# Patient Record
Sex: FEMALE | Race: WHITE | NOT HISPANIC OR LATINO | Employment: UNEMPLOYED | URBAN - METROPOLITAN AREA
[De-identification: names, ages, dates, MRNs, and addresses within clinical notes are randomized per-mention and may not be internally consistent; named-entity substitution may affect disease eponyms.]

---

## 2017-01-27 ENCOUNTER — OFFICE VISIT (OUTPATIENT)
Dept: NEUROLOGY | Facility: CLINIC | Age: 22
End: 2017-01-27
Payer: OTHER GOVERNMENT

## 2017-01-27 VITALS
HEART RATE: 60 BPM | DIASTOLIC BLOOD PRESSURE: 80 MMHG | SYSTOLIC BLOOD PRESSURE: 120 MMHG | BODY MASS INDEX: 20.56 KG/M2 | WEIGHT: 111.75 LBS | HEIGHT: 62 IN

## 2017-01-27 DIAGNOSIS — R55 SYNCOPE, UNSPECIFIED SYNCOPE TYPE: Primary | ICD-10-CM

## 2017-01-27 PROCEDURE — 99204 OFFICE O/P NEW MOD 45 MIN: CPT | Mod: S$PBB,,, | Performed by: PSYCHIATRY & NEUROLOGY

## 2017-01-27 PROCEDURE — 99999 PR PBB SHADOW E&M-EST. PATIENT-LVL III: CPT | Mod: PBBFAC,,, | Performed by: PSYCHIATRY & NEUROLOGY

## 2017-01-27 PROCEDURE — 99213 OFFICE O/P EST LOW 20 MIN: CPT | Mod: PBBFAC | Performed by: PSYCHIATRY & NEUROLOGY

## 2017-01-27 RX ORDER — LEVETIRACETAM 250 MG/1
500 TABLET ORAL 2 TIMES DAILY
Qty: 120 TABLET | Refills: 11 | Status: SHIPPED | OUTPATIENT
Start: 2017-01-27 | End: 2023-01-03

## 2017-01-27 NOTE — LETTER
January 27, 2017      Francine Singh MD  [address] [facility] [address]           [facility]  [address] [place] [address]  Phone: [phone]  Fax: [phone]          Patient: Sherice De Santiago   MR Number: [id number]   YOB: 1995   Date of Visit: 1/27/2017       Dear Dr. Francine Singh:    Thank you for referring Sherice De Santiago to me for evaluation. Attached you will find relevant portions of my assessment and plan of care.    If you have questions, please do not hesitate to call me. I look forward to following Sherice De Santiago along with you.    Sincerely,    Colin Mckeon MD    Enclosure  CC:  No Recipients    If you would like to receive this communication electronically, please contact [email or web address] or [phone] to request more information on [email or web address] Link access.    For providers and/or their staff who would like to refer a patient to [facility], please contact us through our one-stop-shop provider referral line, [facility], at [phone].    If you feel you have received this communication in error or would no longer like to receive these types of communications, please e-mail [email or web address]

## 2017-01-27 NOTE — MR AVS SNAPSHOT
[facility]  [address]  [place] [address]  Phone: [phone]  Fax: [phone]                  Sherice De Santiago   2017 2:00 PM   Office Visit    Description:  Female : 1995   Provider:  Colin Mckeon MD   Department:  [facility]           Reason for Visit     Seizures           Diagnoses this Visit        Comments    Syncope, unspecified syncope type    -  Primary            To Do List           Future Appointments        Provider Department Dept Phone    2017 11:40 AM Colin Mckeon MD [facility] [phone]      Goals (5 Years of Data)     None      Follow-Up and Disposition     Return in about 3 months (around 2017).       These Medications        Disp Refills Start End    levetiracetam (KEPPRA) 250 MG Tab 120 tablet 11 2017    Take 2 tablets (500 mg total) by mouth 2 (two) times daily. - Oral    Pharmacy: [facility] - [address] AT [facility] Ph #: [phone]         [facility]     [facility] On [facility] Nurse Care Line -  Assistance  Registered nurses in the [facility] Center provide clinical advisement, health education, appointment booking, and other advisory services.  Call for this free service at [phone].             Medications           Message regarding Medications     Verify the changes and/or additions to your medication regime listed below are the same as discussed with your clinician today.  If any of these changes or additions are incorrect, please notify your healthcare provider.        START taking these NEW medications        Refills    levetiracetam (KEPPRA) 250 MG Tab 11    Sig: Take 2 tablets (500 mg total) by mouth 2 (two) times daily.    Class: Normal    Route: Oral           Verify that the below list of medications is an accurate representation of the medications you are currently taking.  If none reported, the list may  "be blank. If incorrect, please contact your healthcare provider. Carry this list with you in case of emergency.           Current Medications     hydrOXYzine pamoate (VISTARIL) 25 MG Cap Take 1 capsule (25 mg total) by mouth nightly as needed.    levetiracetam (KEPPRA) 250 MG Tab Take 2 tablets (500 mg total) by mouth 2 (two) times daily.           Clinical Reference Information           Vital Signs - Last Recorded  Most recent update: 1/27/2017  2:03 PM by Saadia Cano MA    BP Pulse Ht Wt BMI    120/80 (87 %/ 94 %)* 60 5' 2" (1.575 m) (19 %, Z= -0.90) 50.7 kg (111 lb 12.4 oz) (19 %, Z= -0.87) 20.44 kg/m2 (35 %, Z= -0.40)    *BP percentiles are based on NHBPEP's 4th Report    Growth percentiles are based on CDC 2-20 Years data.      Blood Pressure          Most Recent Value    BP  120/80      Allergies as of 1/27/2017     No Known Allergies      Immunizations Administered on Date of Encounter - 1/27/2017     None      "

## 2017-01-27 NOTE — PROGRESS NOTES
"This is a 21-year-old left-handed patient who indicates that she has had an almost lifelong experience with seizure which began around the age of 4.  The patient states that in the past her seizure has usually been triggered by a painful stimulus.  The patient remembers around the age of 10 being placed on medication until she was around the age of 14.  In those for years, the patient states that she did not have any seizure.  After the age of 14, her medications were withdrawn.    The patient states that since then she has had very infrequent episodes.  The patient states that she has had multiple EEGs and scans some of which were abnormal others of which were questionable as far as abnormality on an EEG.  The patient states that she has seen neurology in multiple different areas of the country as she was moving in association with her family who was in the .    Her last episode occurred about 1 month ago and her episode prior to that was 8 months before.  The episode begins with an aura of lethargy.  This is then followed by a gradual shutdown of "all my senses" until she finally loses vision and then has loss of consciousness.  The involving symptoms occur over less than than a minute.  The patient states that when she loses consciousness she will fall.  She has been told that her eyes roll up and that she shakes both arms and both legs uncontrollably for less than a minute.  She is only had 1 episode of tongue biting.  She has had very infrequent episodes of incontinence.    Following the episode, she feels "exhausted and out of it."  She may or may not have a headache afterwards.    The patient is voicing concern because she is also experiencing rather significant symptoms of anxiety.  In fact, she does have an upcoming appointment with psychiatry to discuss possible medication management of her anxiety.    The patient does not recall the name of prior medications.  When naming multiple medications, the " patient could not recall which of the medications mentioned she may have been exposed to.  As mentioned, she's had MRI done which was normal.  Her EEGs have been variously normal or abnormal.  She does not know any further details of those studies.      ROS:  GENERAL: No fever, chills, fatigability or weight loss.  SKIN: No rashes, itching or changes in color or texture of skin.  HEAD: No headaches or recent head trauma.  EYES: Visual acuity fine. No photophobia, ocular pain or diplopia.  EARS: Denies ear pain, discharge or vertigo.  NOSE: No loss of smell, no epistaxis or postnasal drip.  MOUTH & THROAT: No hoarseness or change in voice. No excessive gum bleeding.  NODES: Denies swollen glands.  CHEST: Denies ORDOÑEZ, cyanosis, wheezing, cough and sputum production.  CARDIOVASCULAR: Denies chest pain, PND, orthopnea or reduced exercise tolerance.  ABDOMEN: Appetite fine. No weight loss. Denies diarrhea, abdominal pain, hematemesis or blood in stool.  URINARY: No flank pain, dysuria or hematuria.  MUSCULOSKELETAL: No joint stiffness or swelling. Denies back pain.  NEUROLOGIC: No history of  paralysis, alteration of gait or coordination.    PAST HISTORY:  Surgery: Adenoidectomy, tubes in the ears  Medical: No other chronic medical illnesses  ALLERGIES: NO KNOWN DRUG ALLERGIES    FAMILY HISTORY:  The patient's father  at age 28 of an accidental drug overdose.  Apparently he was known to have had generalized seizures.  The patient's paternal grandmother was also apparently utilizing drugs and had seizures when using drugs.  The patient's mother is living and in good health.  She has 2 half-sisters.  There is no other known family history of seizure.    SOCIAL HISTORY:  The patient is single.  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    PE:   VITAL SIGNS: Blood pressure 120/80, pulse 76, weight 50.7 kg, height 5 foot 2 inches, BMI 20.44  APPEARANCE: Well nourished,  well developed, in no acute distress.  The patient is somewhat anxious.    HEAD: Normocephalic, atraumatic.  EYES: PERRL. EOMI.  Non-icteric sclerae.    EARS: TM's intact. Light reflex normal. No retraction or perforation.    NOSE: Mucosa pink. Airway clear.  MOUTH & THROAT: No tonsillar enlargement. No pharyngeal erythema or exudate. No stridor.  NECK: Supple. No bruits.  CHEST: Lungs clear to auscultation.  CARDIOVASCULAR: Regular rhythm without significant murmurs.  ABDOMEN: Bowel sounds normal. Not distended.   MUSCULOSKELETAL:  No bony deformity seen.   NEUROLOGIC:   Mental Status:  The patient is well oriented to person, time, place, and situation.  The patient is attentive to the environment and cooperative for the exam.  Cranial Nerves: II-XII grossly intact.  Visual acuity with hand held chart is 20/20 right eye, left eye, and both eyes.  The patient perceives the color red to be the same shade of red with each eye.  Fundoscopic exam is normal.  No hemorrhage, exudate or papilledema is present. The extraocular muscles are intact in the cardinal directions of gaze.  No ptosis is present. Facial features are symmetrical.  Speech is normal in fluency, diction, and phrasing.  Tongue protrudes in the midline.    Gait and Station:  Romberg is negative.  Good alternate armswing with normal gait.  Motor:  No downdrift of either arm when held at shoulder level.  Manual muscle testing of proximal and distal muscles of both upper and lower extremities is normal. Muscle mass is normal.  Muscle tone is normal.  Sensory:  Intact both upper and lower extremities to pin prick, touch, and vibration.  Cerebellar:  Finger to nose done well.  Alternating movements intact.  No involuntary movements or tremor seen.  Reflexes:  Stretch reflexes are 2+ both upper and lower extremities.  Plantar stimulation is flexor bilaterally and no pathological reflexes are seen     ASSESSMENT:  1.  Seizure disorder, generalized  seizures    DISCUSSION:  The patient is obviously had multiple evaluations done in the past and had an established diagnosis of epilepsy as a child and into her adolescent years.  She continues to experience her episodes but they are infrequent.  However, the patient is living alone and independently and is wanting to drive independently.  Therefore, it is recommended that she be placed on anticonvulsant medication.  Discussion was held with the patient regarding this recommendation.  She further indicates that she is experiencing generalized anxiety symptoms and is wanting to get on medication for her generalized anxiety.    RECOMMENDATIONS:  1.  Start Keppra 250 mg twice a day.    2.  The patient was advised to continue communication through the patient portal and to return in 3 months for routine follow-up visit.  We would then consider further diagnostic studies although this is been done in the past on multiple occasions.

## 2017-02-03 ENCOUNTER — INITIAL CONSULT (OUTPATIENT)
Dept: PSYCHIATRY | Facility: CLINIC | Age: 22
End: 2017-02-03
Payer: OTHER GOVERNMENT

## 2017-02-03 ENCOUNTER — PATIENT MESSAGE (OUTPATIENT)
Dept: PSYCHIATRY | Facility: CLINIC | Age: 22
End: 2017-02-03

## 2017-02-03 DIAGNOSIS — F41.9 ANXIETY: Primary | ICD-10-CM

## 2017-02-03 PROCEDURE — 90792 PSYCH DIAG EVAL W/MED SRVCS: CPT | Mod: PBBFAC,PO | Performed by: PSYCHIATRY & NEUROLOGY

## 2017-02-03 PROCEDURE — 90792 PSYCH DIAG EVAL W/MED SRVCS: CPT | Mod: S$PBB,,, | Performed by: PSYCHIATRY & NEUROLOGY

## 2017-02-03 RX ORDER — SERTRALINE HYDROCHLORIDE 100 MG/1
TABLET, FILM COATED ORAL
Qty: 30 TABLET | Refills: 1 | Status: SHIPPED | OUTPATIENT
Start: 2017-02-03 | End: 2017-03-07 | Stop reason: SDUPTHER

## 2017-02-03 NOTE — PROGRESS NOTES
"Outpatient Psychiatry Initial Visit (MD/NP)    2/3/2017    Sherice De Santiago, a 21 y.o. female, presenting for initial evaluation visit. Met with patient.    Reason for Encounter: Referral from Dr. Singh. Patient complains of anxiety, depression.    History of Present Illness: 20 y/o WF with no previous mental health diagnoses, here for anxiety in context of prolonged conflict with her mother that has also limited contact with & complicated relationships with her younger sisters, led patient to move out, assume financial independence. Reports mother "stopped talking to me", treating patient coldly after she started dating a young  man, & that a serious of otherwise minor conflicts between her & her mother "turned into screaming matches" & led to patient no longer being able to use car anymore, moving out. She says "my mother started brainwashing my sister against me" such that 1 sister doesn't want to see her & another she's not able to see much due to arguments that occur when patient is around mother. Grandparents are supportive & have tried to be supportive without alienating her mother, but she feels guilty about the strain she puts on them & on her sisters with mother "being bitter toward everything". Mother, for her part, has never taken responsibility for her original problem with dtr's choice of dating partner. Pt. reports that she's "mentally & emotionally exhausted", anergic, waking frequently during sleep, having sleep vigils & feeling unrested on waking, overworry/feeling overwhelmed by circumstances. Was prescribed hydroxyzine, finds its sedating properties limit when she can use it (rarely). Grades have dropped & blames stress in life for breakup several weeks ago.     From PCP Note:     Here to establish care, new to my clinic. Patient reported that she was diagnosed with seizures at the age of 4, but has been passing out off and on, informs that she has her eyes ruled out and passes, was " "diagnosed with syncope, patient was treated with seizure medication from age [age], but has been discontinued. Patient reports that she gets seizures causing passing out , most recent episode was 2016. Patient came in with complaint of feeling anxious, since a month of January, patient has not been having a good relation with her mom, reports that she's currently living with her grand mother, as she does not have a extra bedroom at her mom's house, as she lives in the 3 bedroom , 2 bedrooms are shared by her younger sisters; Patient reports that whenever she talks to mom, she gets worked up and feels that she is about to pass out. tried counseling in the past, but informs that her mother is not willing to get counseling as well. has been feeling extremely tired and does not feel rested even after getting good sleep. on Depo-Provera for the past 4 months, recently had her second injection. Denies any suicidal or homicidal thoughts. Patient has been occasionally tearful. has been having headaches and neck pain, few days ago. Denies any vision disturbances.    PsychHx: outpatient psychotherapy (brief); recent prescription of hydroxyzine (side effects limit use). No SI/HI/Self-harm behaviors. No hx of valerie, AVH, hospitalizations.   MedHx: epilepsy  SubstanceHx: denies alcohol, illicits, prescription misuse  FamHx: no formal hx; suspects mother mood or personality problems  SocialHx: oldest of 3 sisters. Father  at 7, mother remarried & patient's former stepfather remains a "friend". Works at [employer]. Grandparents supportive. Single.     Review Of Systems:     GENERAL:  No weight gain or loss  SKIN:  No rashes or lacerations  HEAD:  No headaches  EYES:  No exophthalmos, jaundice or blindness  EARS:  No dizziness, tinnitus or hearing loss  NOSE:  No changes in smell  MOUTH & THROAT:  No dyskinetic movements or obvious goiter  CHEST:  No shortness of breath, " hyperventilation or cough  CARDIOVASCULAR:  No tachycardia or chest pain  ABDOMEN:  No nausea, vomiting, pain, constipation or diarrhea  URINARY:  No frequency, dysuria or sexual dysfunction  ENDOCRINE:  No polydipsia, polyuria  MUSCULOSKELETAL:  No pain or stiffness of the joints  NEUROLOGIC:  No weakness, sensory changes, seizures, confusion, memory loss, tremor or other abnormal movements    Current Evaluation:     Nutritional Screening: Considering the patient's height and weight, medications, medical history and preferences, should a referral be made to the dietitian? no    Constitutional  Vitals:  Most recent vital signs, dated less than 90 days prior to this appointment, were reviewed.    There were no vitals filed for this visit.     General:  unremarkable, age appropriate     Musculoskeletal  Muscle Strength/Tone:  no tremor, no tic   Gait & Station:  non-ataxic     Psychiatric  Appearance: casually dressed & groomed;   Behavior: calm,   Cooperation: cooperative with assessment  Speech: normal rate, volume, tone  Thought Process: linear, goal-directed  Thought Content: No suicidal or homicidal ideation; no delusions  Affect: tearful  Mood: anxious  Perceptions: No auditory or visual hallucinations  Level of Consciousness: alert throughout interview  Insight: fair  Cognition: Oriented to person, place, time, & situation  Memory: no apparent deficits to general clinical interview; not formally assessed  Attention/Concentration: no apparent deficits to general clinical interview; not formally assessed  Fund of Knowledge: average by vocabulary/education      Relevant Elements of Neurological Exam: normal gait    Laboratory Data  No visits with results within 1 Month(s) from this visit.  Latest known visit with results is:    Lab Visit on 12/19/2016   Component Date Value Ref Range Status    WBC 12/19/2016 5.26  3.90 - 12.70 K/uL Final    RBC 12/19/2016 4.54  4.00 - 5.40 M/uL Final    Hemoglobin 12/19/2016  13.8  12.0 - 16.0 g/dL Final    Hematocrit 12/19/2016 41.3  37.0 - 48.5 % Final    MCV 12/19/2016 91  82 - 98 fL Final    MCH 12/19/2016 30.4  27.0 - 31.0 pg Final    MCHC 12/19/2016 33.4  32.0 - 36.0 % Final    RDW 12/19/2016 12.8  11.5 - 14.5 % Final    Platelets 12/19/2016 210  150 - 350 K/uL Final    MPV 12/19/2016 11.8  9.2 - 12.9 fL Final    Gran # 12/19/2016 3.2  1.8 - 7.7 K/uL Final    Lymph # 12/19/2016 1.5  1.0 - 4.8 K/uL Final    Mono # 12/19/2016 0.5  0.3 - 1.0 K/uL Final    Eos # 12/19/2016 0.1  0.0 - 0.5 K/uL Final    Baso # 12/19/2016 0.02  0.00 - 0.20 K/uL Final    Gran% 12/19/2016 61.6  38.0 - 73.0 % Final    Lymph% 12/19/2016 27.8  18.0 - 48.0 % Final    Mono% 12/19/2016 8.7  4.0 - 15.0 % Final    Eosinophil% 12/19/2016 1.3  0.0 - 8.0 % Final    Basophil% 12/19/2016 0.4  0.0 - 1.9 % Final    Differential Method 12/19/2016 Automated   Final    Sodium 12/19/2016 140  136 - 145 mmol/L Final    Potassium 12/19/2016 4.0  3.5 - 5.1 mmol/L Final    Chloride 12/19/2016 108  95 - 110 mmol/L Final    CO2 12/19/2016 24  23 - 29 mmol/L Final    Glucose 12/19/2016 79  70 - 110 mg/dL Final    BUN, Bld 12/19/2016 15  6 - 20 mg/dL Final    Creatinine 12/19/2016 0.8  0.5 - 1.4 mg/dL Final    Calcium 12/19/2016 9.7  8.7 - 10.5 mg/dL Final    Total Protein 12/19/2016 7.4  6.0 - 8.4 g/dL Final    Albumin 12/19/2016 4.3  3.5 - 5.2 g/dL Final    Total Bilirubin 12/19/2016 1.4* 0.1 - 1.0 mg/dL Final    Alkaline Phosphatase 12/19/2016 55  55 - 135 U/L Final    AST 12/19/2016 25  10 - 40 U/L Final    ALT 12/19/2016 23  10 - 44 U/L Final    Anion Gap 12/19/2016 8  8 - 16 mmol/L Final    eGFR if African American 12/19/2016 >60.0  >60 mL/min/1.73 m^2 Final    eGFR if non African American 12/19/2016 >60.0  >60 mL/min/1.73 m^2 Final    Cholesterol 12/19/2016 140  120 - 199 mg/dL Final    Triglycerides 12/19/2016 43  30 - 150 mg/dL Final    HDL 12/19/2016 60  40 - 75 mg/dL Final    LDL  Cholesterol 12/19/2016 71.4  63.0 - 159.0 mg/dL Final    HDL/Chol Ratio 12/19/2016 42.9  20.0 - 50.0 % Final    Total Cholesterol/HDL Ratio 12/19/2016 2.3  2.0 - 5.0 Final    Non-HDL Cholesterol 12/19/2016 80  mg/dL Final    TSH 12/19/2016 1.770  0.400 - 4.000 uIU/mL Final    Chlamydia, Amplified DNA 12/19/2016 Negative   Final    N gonorrhoeae, amplified DNA 12/19/2016 Negative   Final         Medications  Outpatient Encounter Prescriptions as of 2/3/2017   Medication Sig Dispense Refill    hydrOXYzine pamoate (VISTARIL) 25 MG Cap Take 1 capsule (25 mg total) by mouth nightly as needed. 15 capsule 0    levetiracetam (KEPPRA) 250 MG Tab Take 2 tablets (500 mg total) by mouth 2 (two) times daily. 120 tablet 11     Facility-Administered Encounter Medications as of 2/3/2017   Medication Dose Route Frequency Provider Last Rate Last Dose    medroxyPROGESTERone (DEPO-PROVERA) injection 150 mg  150 mg Intramuscular Q90 Days Morgan Sanchez MD   150 mg at 12/13/16 0930       Assessment - Diagnosis - Goals:     Impression: This 22 y/o WF with ~6-9 months of anxiety & difficulty managing abrupt transition to adult role, other symptoms in context of conflict with mother & strain on other family relationships.      Anxiety    Treatment Goals:  Specify outcomes written in observable, behavioral terms:   Decrease anxious & depressed moods by self-report    Treatment Plan/Recommendations:   · Medication Management: The risks and benefits of medication were discussed with the patient. sertraline taper up to 100 mg daily. may continue hydroxyzine.   · Psychotherapy for benefits of support/remoralization, skills for communication, boundaries, grief (loss of maternal relationship)      Return to Clinic: 1 month    Counseling time: 10 minutes  Total time: 50 minutes    DIANE Rich MD

## 2017-02-20 ENCOUNTER — TELEPHONE (OUTPATIENT)
Dept: OBSTETRICS AND GYNECOLOGY | Facility: CLINIC | Age: 22
End: 2017-02-20

## 2017-02-20 ENCOUNTER — TELEPHONE (OUTPATIENT)
Dept: NEUROLOGY | Facility: CLINIC | Age: 22
End: 2017-02-20

## 2017-02-20 NOTE — TELEPHONE ENCOUNTER
----- Message from Vijay Puentes sent at 2/20/2017  9:55 AM CST -----  Contact: self [phone] please call between 2 pm-4 pm  Patient called stating she is having constant bleeding from depo . Patient asking to get a low dosage  for birth control pill. Thanks!

## 2017-02-20 NOTE — TELEPHONE ENCOUNTER
----- Message from Saadia Cano MA sent at 2/20/2017 10:14 AM CST -----  Contact: Pt   See message below  ----- Message -----     From: Zoey Larios     Sent: 2/20/2017  10:02 AM       To: [facility] Staff    Pt is requesting to speak to the nurse regarding a doctor's note that she needs for her employer. Pt needs a letter explaining her condition (epilepsy), and that it's okay for her to work. Pls fax the letter to [phone]. Pt can be reached at [phone]. Pt states that she's at work, so pls call her between 2pm and 4pm.

## 2017-02-20 NOTE — TELEPHONE ENCOUNTER
Spoke to patient's mom- informed her [facility] verbalized understanding.

## 2017-02-20 NOTE — LETTER
February 20, 2017    Sherice De Santiago  [address]  [address]  [place] [address]             [facility]  [address] [place] [address]  Phone: [phone]  Fax: [phone] Dear Ms. De Santiago:    As you know, you have had a lifelong seizure disorder although the seizures have been very infrequent to occur.  You have recently been placed on appropriate medication to prevent further seizure.  While on this medication, year chance of having a seizure are markedly reduced.  In addition as you know, you have an appropriate warning before any seizure occurs during which time you can react appropriately.    Because of the infrequent nature of her seizure event, and the fact that you're now on appropriate medications, I would advise you that she could return to work with out limitations.      If you have any questions or concerns, please don't hesitate to call.    Sincerely,        Colin Mckeon MD

## 2017-02-24 ENCOUNTER — NURSE TRIAGE (OUTPATIENT)
Dept: ADMINISTRATIVE | Facility: CLINIC | Age: 22
End: 2017-02-24

## 2017-02-24 NOTE — TELEPHONE ENCOUNTER
Reason for Disposition   Caller has medication question only, adult not sick, and triager answers question    Protocols used: ST MEDICATION QUESTION CALL-A-HANNAH Smiley is following up with her doctor soon, but reports since starting antidepressants she is having jaw clenching and soreness around her jaw. She is wondering if it safe to take ibuprofen. Advised there are no known interactions b/t this medication and her current medication list. Encouraged her to focus on when she is clenching and to break the habit. Sometimes stress/anxiety can cause a person to do this, and advised the more she does it the more tight it can get, and it can perpetuate over time. She will discuss further with her doctor in case he feels it is a side effect of the medication. Advised if any further questions she is welcome to call back. Please contact caller with any further care advice.

## 2017-03-07 ENCOUNTER — OFFICE VISIT (OUTPATIENT)
Dept: PSYCHIATRY | Facility: CLINIC | Age: 22
End: 2017-03-07
Payer: OTHER GOVERNMENT

## 2017-03-07 DIAGNOSIS — F41.9 ANXIETY: Primary | ICD-10-CM

## 2017-03-07 PROCEDURE — 99213 OFFICE O/P EST LOW 20 MIN: CPT | Mod: S$PBB,,, | Performed by: PSYCHIATRY & NEUROLOGY

## 2017-03-07 RX ORDER — SERTRALINE HYDROCHLORIDE 100 MG/1
100 TABLET, FILM COATED ORAL DAILY
Qty: 30 TABLET | Refills: 2 | Status: SHIPPED | OUTPATIENT
Start: 2017-03-07 | End: 2023-01-03

## 2017-03-07 NOTE — PROGRESS NOTES
"Outpatient Psychiatry Follow-up Visit (MD/NP)    3/7/2017    Sherice De Santiago, a 21 y.o. female, presenting for follow-up visit. Met with patient.    Reason for Encounter: f/u, anxiety, likely adjustment to chronic stressor    IntervalHx: Patient returns for f/u, reports moderately improved moods compared to pre-treatment. Has been adherent to medication, denies side effects. Energy, concentration better, less overwhelmed, functioning ok in academic, work roles. Getting support by grandparents & stepfather, has ongoing estrangement from mother, though did get several texts from her. Mother still attempting to keep her sisters from her.     Background: 20 y/o WF with no previous mental health diagnoses, here for anxiety in context of prolonged conflict with her mother that has also limited contact with & complicated relationships with her younger sisters, led patient to move out, assume financial independence. Reports mother "stopped talking to me", treating patient coldly after she started dating a young  man, & that a serious of otherwise minor conflicts between her & her mother "turned into screaming matches" & led to patient no longer being able to use car anymore, moving out. She says "my mother started brainwashing my sister against me" such that 1 sister doesn't want to see her & another she's not able to see much due to arguments that occur when patient is around mother. Grandparents are supportive & have tried to be supportive without alienating her mother, but she feels guilty about the strain she puts on them & on her sisters with mother "being bitter toward everything". Mother, for her part, has never taken responsibility for her original problem with dtr's choice of dating partner. Pt. reports that she's "mentally & emotionally exhausted", anergic, waking frequently during sleep, having sleep vigils & feeling unrested on waking, overworry/feeling overwhelmed by circumstances. Was prescribed " "hydroxyzine, finds its sedating properties limit when she can use it (rarely). Grades have dropped & blames stress in life for breakup several weeks ago. From PCP Note: Here to establish care, new to my clinic. Patient reported that she was diagnosed with seizures at the age of 4, but has been passing out off and on, informs that she has her eyes ruled out and passes, was diagnosed with syncope, patient was treated with seizure medication from age [age], but has been discontinued. Patient reports that she gets seizures causing passing out , most recent episode was 2016. Patient came in with complaint of feeling anxious, since a month of January, patient has not been having a good relation with her mom, reports that she's currently living with her grand mother, as she does not have a extra bedroom at her mom's house, as she lives in the 3 bedroom , 2 bedrooms are shared by her younger sisters; Patient reports that whenever she talks to mom, she gets worked up and feels that she is about to pass out. tried counseling in the past, but informs that her mother is not willing to get counseling as well. has been feeling extremely tired and does not feel rested even after getting good sleep. on Depo-Provera for the past 4 months, recently had her second injection. Denies any suicidal or homicidal thoughts. Patient has been occasionally tearful. has been having headaches and neck pain, few days ago. Denies any vision disturbances. PsychHx: outpatient psychotherapy (brief); recent prescription of hydroxyzine (side effects limit use). No SI/HI/Self-harm behaviors. No hx of valeire, AVH, hospitalizations. MedHx: epilepsy; SubstanceHx: denies alcohol, illicits, prescription misuse; FamHx: no formal hx; suspects mother mood or personality problems; SocialHx: oldest of 3 sisters. Father  at 7, mother remarried & patient's former stepfather remains a "friend". Works at [employer]. " Grandparents supportive. Single.     Review Of Systems:     GENERAL:  No weight gain or loss  SKIN:  No rashes or lacerations  HEAD:  No headaches  EYES:  No exophthalmos, jaundice or blindness  EARS:  No dizziness, tinnitus or hearing loss  NOSE:  No changes in smell  MOUTH & THROAT:  No dyskinetic movements or obvious goiter  CHEST:  No shortness of breath, hyperventilation or cough  CARDIOVASCULAR:  No tachycardia or chest pain  ABDOMEN:  No nausea, vomiting, pain, constipation or diarrhea  URINARY:  No frequency, dysuria or sexual dysfunction  ENDOCRINE:  No polydipsia, polyuria  MUSCULOSKELETAL:  No pain or stiffness of the joints  NEUROLOGIC:  No weakness, sensory changes, seizures, confusion, memory loss, tremor or other abnormal movements    Current Evaluation:     Nutritional Screening: Considering the patient's height and weight, medications, medical history and preferences, should a referral be made to the dietitian? no    Constitutional  Vitals:  Most recent vital signs, dated less than 90 days prior to this appointment, were reviewed.    There were no vitals filed for this visit.     General:  unremarkable, age appropriate     Musculoskeletal  Muscle Strength/Tone:  no tremor, no tic   Gait & Station:  non-ataxic     Psychiatric  Appearance: casually dressed & groomed;   Behavior: calm,   Cooperation: cooperative with assessment  Speech: normal rate, volume, tone  Thought Process: linear, goal-directed  Thought Content: No suicidal or homicidal ideation; no delusions  Affect: tearful  Mood: anxious  Perceptions: No auditory or visual hallucinations  Level of Consciousness: alert throughout interview  Insight: fair  Cognition: Oriented to person, place, time, & situation  Memory: no apparent deficits to general clinical interview; not formally assessed  Attention/Concentration: no apparent deficits to general clinical interview; not formally assessed  Fund of Knowledge: average by  vocabulary/education      Relevant Elements of Neurological Exam: normal gait    Laboratory Data  No visits with results within 1 Month(s) from this visit.  Latest known visit with results is:    Lab Visit on 12/19/2016   Component Date Value Ref Range Status    WBC 12/19/2016 5.26  3.90 - 12.70 K/uL Final    RBC 12/19/2016 4.54  4.00 - 5.40 M/uL Final    Hemoglobin 12/19/2016 13.8  12.0 - 16.0 g/dL Final    Hematocrit 12/19/2016 41.3  37.0 - 48.5 % Final    MCV 12/19/2016 91  82 - 98 fL Final    MCH 12/19/2016 30.4  27.0 - 31.0 pg Final    MCHC 12/19/2016 33.4  32.0 - 36.0 % Final    RDW 12/19/2016 12.8  11.5 - 14.5 % Final    Platelets 12/19/2016 210  150 - 350 K/uL Final    MPV 12/19/2016 11.8  9.2 - 12.9 fL Final    Gran # 12/19/2016 3.2  1.8 - 7.7 K/uL Final    Lymph # 12/19/2016 1.5  1.0 - 4.8 K/uL Final    Mono # 12/19/2016 0.5  0.3 - 1.0 K/uL Final    Eos # 12/19/2016 0.1  0.0 - 0.5 K/uL Final    Baso # 12/19/2016 0.02  0.00 - 0.20 K/uL Final    Gran% 12/19/2016 61.6  38.0 - 73.0 % Final    Lymph% 12/19/2016 27.8  18.0 - 48.0 % Final    Mono% 12/19/2016 8.7  4.0 - 15.0 % Final    Eosinophil% 12/19/2016 1.3  0.0 - 8.0 % Final    Basophil% 12/19/2016 0.4  0.0 - 1.9 % Final    Differential Method 12/19/2016 Automated   Final    Sodium 12/19/2016 140  136 - 145 mmol/L Final    Potassium 12/19/2016 4.0  3.5 - 5.1 mmol/L Final    Chloride 12/19/2016 108  95 - 110 mmol/L Final    CO2 12/19/2016 24  23 - 29 mmol/L Final    Glucose 12/19/2016 79  70 - 110 mg/dL Final    BUN, Bld 12/19/2016 15  6 - 20 mg/dL Final    Creatinine 12/19/2016 0.8  0.5 - 1.4 mg/dL Final    Calcium 12/19/2016 9.7  8.7 - 10.5 mg/dL Final    Total Protein 12/19/2016 7.4  6.0 - 8.4 g/dL Final    Albumin 12/19/2016 4.3  3.5 - 5.2 g/dL Final    Total Bilirubin 12/19/2016 1.4* 0.1 - 1.0 mg/dL Final    Alkaline Phosphatase 12/19/2016 55  55 - 135 U/L Final    AST 12/19/2016 25  10 - 40 U/L Final    ALT 12/19/2016  23  10 - 44 U/L Final    Anion Gap 12/19/2016 8  8 - 16 mmol/L Final    eGFR if African American 12/19/2016 >60.0  >60 mL/min/1.73 m^2 Final    eGFR if non African American 12/19/2016 >60.0  >60 mL/min/1.73 m^2 Final    Cholesterol 12/19/2016 140  120 - 199 mg/dL Final    Triglycerides 12/19/2016 43  30 - 150 mg/dL Final    HDL 12/19/2016 60  40 - 75 mg/dL Final    LDL Cholesterol 12/19/2016 71.4  63.0 - 159.0 mg/dL Final    HDL/Chol Ratio 12/19/2016 42.9  20.0 - 50.0 % Final    Total Cholesterol/HDL Ratio 12/19/2016 2.3  2.0 - 5.0 Final    Non-HDL Cholesterol 12/19/2016 80  mg/dL Final    TSH 12/19/2016 1.770  0.400 - 4.000 uIU/mL Final    Chlamydia, Amplified DNA 12/19/2016 Negative   Final    N gonorrhoeae, amplified DNA 12/19/2016 Negative   Final     Medications  Outpatient Encounter Prescriptions as of 3/7/2017   Medication Sig Dispense Refill    hydrOXYzine pamoate (VISTARIL) 25 MG Cap Take 1 capsule (25 mg total) by mouth nightly as needed. 15 capsule 0    levetiracetam (KEPPRA) 250 MG Tab Take 2 tablets (500 mg total) by mouth 2 (two) times daily. 120 tablet 11    sertraline (ZOLOFT) 100 MG tablet Take 1/2 tablet daily for the first seven days then 1 tablet daily thereafter 30 tablet 1     Facility-Administered Encounter Medications as of 3/7/2017   Medication Dose Route Frequency Provider Last Rate Last Dose    medroxyPROGESTERone (DEPO-PROVERA) injection 150 mg  150 mg Intramuscular Q90 Days Morgan Sanchez MD   150 mg at 12/13/16 0930       Assessment - Diagnosis - Goals:     Impression: This 22 y/o WF with ~6-9 months of anxiety & difficulty managing abrupt transition to adult role, other symptoms in context of conflict with mother & strain on other family relationships. Clinically improved amidst ongoing stress.     Anxiety    Treatment Goals:  Specify outcomes written in observable, behavioral terms:   Decrease anxious & depressed moods by self-report    Treatment  Plan/Recommendations:   · Continue sertraline.   · Psychotherapy appointment pending - needs support/remoralization, skills for communication, boundaries, grief (loss of maternal relationship)    Return to Clinic: 1 month    Counseling time: 5 minutes  Total time: 20 minutes    DIANE Rich MD

## 2017-04-20 ENCOUNTER — TELEPHONE (OUTPATIENT)
Dept: OBSTETRICS AND GYNECOLOGY | Facility: CLINIC | Age: 22
End: 2017-04-20

## 2017-04-20 NOTE — TELEPHONE ENCOUNTER
Was on nexplanon for 2 years with constant bleeding.  Switched to depo for 6 months with same results.   In February you gave her 1 pack of OCP to help stop the bleeding.   She doesn't want to do another shot of depo.  Can you just call in pills?    georges Solexel/ [facility] baudilio cheema.

## 2017-04-20 NOTE — TELEPHONE ENCOUNTER
----- Message from Maria Isabel Saldana sent at 4/20/2017  1:00 PM CDT -----  Contact: self  Patient wants to speak with a nurse regarding a Rx for birth control. Please call back at [phone] (home)

## 2017-06-19 ENCOUNTER — TELEPHONE (OUTPATIENT)
Dept: OBSTETRICS AND GYNECOLOGY | Facility: CLINIC | Age: 22
End: 2017-06-19

## 2017-06-19 NOTE — TELEPHONE ENCOUNTER
----- Message from Ekta Prado sent at 6/19/2017 11:07 AM CDT -----  Contact: self  Would like something called in for a yeast infection [place] and would like it called into [facility] in [place].  Please call back at [phone] (home)   [place] [id number], [place]  [phone]

## 2017-09-25 RX ORDER — SERTRALINE HYDROCHLORIDE 100 MG/1
TABLET, FILM COATED ORAL
Qty: 30 TABLET | Refills: 0 | OUTPATIENT
Start: 2017-09-25

## 2018-10-24 RX ORDER — SERTRALINE HYDROCHLORIDE 100 MG/1
TABLET, FILM COATED ORAL
Qty: 30 TABLET | Refills: 0 | OUTPATIENT
Start: 2018-10-24

## 2022-11-12 ENCOUNTER — OFFICE VISIT (OUTPATIENT)
Dept: URGENT CARE | Facility: CLINIC | Age: 27
End: 2022-11-12
Payer: OTHER GOVERNMENT

## 2022-11-12 VITALS
RESPIRATION RATE: 16 BRPM | DIASTOLIC BLOOD PRESSURE: 65 MMHG | HEIGHT: 62 IN | SYSTOLIC BLOOD PRESSURE: 111 MMHG | TEMPERATURE: 98 F | HEART RATE: 85 BPM | BODY MASS INDEX: 21.16 KG/M2 | OXYGEN SATURATION: 99 % | WEIGHT: 115 LBS

## 2022-11-12 DIAGNOSIS — R09.81 NASAL CONGESTION: ICD-10-CM

## 2022-11-12 DIAGNOSIS — J30.9 ALLERGIC RHINITIS, UNSPECIFIED SEASONALITY, UNSPECIFIED TRIGGER: ICD-10-CM

## 2022-11-12 DIAGNOSIS — J02.9 SORE THROAT: Primary | ICD-10-CM

## 2022-11-12 LAB
CTP QC/QA: YES
CTP QC/QA: YES
MOLECULAR STREP A: NEGATIVE
POC MOLECULAR INFLUENZA A AGN: NEGATIVE
POC MOLECULAR INFLUENZA B AGN: NEGATIVE

## 2022-11-12 PROCEDURE — 87651 STREP A DNA AMP PROBE: CPT | Mod: QW,S$GLB,, | Performed by: NURSE PRACTITIONER

## 2022-11-12 PROCEDURE — 87502 INFLUENZA DNA AMP PROBE: CPT | Mod: QW,S$GLB,, | Performed by: NURSE PRACTITIONER

## 2022-11-12 PROCEDURE — 87502 POCT INFLUENZA A/B MOLECULAR: ICD-10-PCS | Mod: QW,S$GLB,, | Performed by: NURSE PRACTITIONER

## 2022-11-12 PROCEDURE — 99213 PR OFFICE/OUTPT VISIT, EST, LEVL III, 20-29 MIN: ICD-10-PCS | Mod: S$GLB,,, | Performed by: NURSE PRACTITIONER

## 2022-11-12 PROCEDURE — 99213 OFFICE O/P EST LOW 20 MIN: CPT | Mod: S$GLB,,, | Performed by: NURSE PRACTITIONER

## 2022-11-12 PROCEDURE — 87651 POCT STREP A MOLECULAR: ICD-10-PCS | Mod: QW,S$GLB,, | Performed by: NURSE PRACTITIONER

## 2022-11-12 RX ORDER — FLUTICASONE PROPIONATE 50 MCG
2 SPRAY, SUSPENSION (ML) NASAL DAILY
Qty: 18.2 ML | Refills: 0 | Status: SHIPPED | OUTPATIENT
Start: 2022-11-12 | End: 2022-12-12

## 2022-11-12 RX ORDER — DESVENLAFAXINE 100 MG/1
100 TABLET, EXTENDED RELEASE ORAL DAILY
COMMUNITY
End: 2023-01-17 | Stop reason: SDUPTHER

## 2022-11-12 NOTE — PROGRESS NOTES
"Subjective:       Patient ID: Sherice De Santiago is a 27 y.o. female.    Vitals:  height is 5' 2" (1.575 m) and weight is 52.2 kg (115 lb). Her oral temperature is 98.4 °F (36.9 °C). Her blood pressure is 111/65 and her pulse is 85. Her respiration is 16 and oxygen saturation is 99%.     Chief Complaint: Sore Throat    Patient is present in the office with ear pressure, headache, and sore throat. Patient symptoms started 10/09/2022. Patient is taking OTC tylenol.     Sore Throat   This is a new problem. The current episode started in the past 7 days. The problem has been unchanged. Neither side of throat is experiencing more pain than the other. There has been no fever. The pain is at a severity of 6/10. Associated symptoms include congestion, coughing, ear pain, headaches, swollen glands and trouble swallowing. Pertinent negatives include no abdominal pain, diarrhea, drooling, ear discharge, hoarse voice, plugged ear sensation, neck pain, shortness of breath, stridor or vomiting. She has tried acetaminophen for the symptoms. The treatment provided no relief.     Constitution: Negative for fatigue and fever.   HENT:  Positive for ear pain, congestion, sore throat and trouble swallowing. Negative for ear discharge and drooling.    Neck: Negative for neck pain and neck stiffness.   Respiratory:  Positive for cough. Negative for shortness of breath and stridor.    Gastrointestinal:  Negative for abdominal pain, vomiting and diarrhea.   Neurological:  Positive for headaches.     Objective:      Physical Exam   Constitutional: She is oriented to person, place, and time. She appears well-developed. She is cooperative.  Non-toxic appearance. She does not appear ill. No distress.   HENT:   Head: Normocephalic and atraumatic.   Ears:   Right Ear: Hearing, external ear and ear canal normal. A middle ear effusion (clear fluid) is present.   Left Ear: Hearing, tympanic membrane, external ear and ear canal normal.   Nose: Mucosal " edema and rhinorrhea present. No purulent discharge, sinus tenderness or nasal deformity. No epistaxis. Right sinus exhibits no maxillary sinus tenderness and no frontal sinus tenderness. Left sinus exhibits no maxillary sinus tenderness and no frontal sinus tenderness.   Mouth/Throat: Uvula is midline and mucous membranes are normal. No trismus in the jaw. Normal dentition. No uvula swelling. Posterior oropharyngeal erythema (clear mucus posterior pharynx) present. No oropharyngeal exudate or posterior oropharyngeal edema.   Eyes: Conjunctivae and lids are normal. No scleral icterus.   Neck: Trachea normal and phonation normal. Neck supple. No edema present. No erythema present. No neck rigidity present.   Cardiovascular: Normal rate, regular rhythm, normal heart sounds and normal pulses.   Pulmonary/Chest: Effort normal and breath sounds normal. No respiratory distress. She has no decreased breath sounds. She has no rhonchi.   Abdominal: Normal appearance.   Musculoskeletal: Normal range of motion.         General: No deformity. Normal range of motion.   Neurological: She is alert and oriented to person, place, and time. She exhibits normal muscle tone. Coordination normal.   Skin: Skin is warm, dry, intact, not diaphoretic and not pale.   Psychiatric: Her speech is normal and behavior is normal. Judgment and thought content normal.   Nursing note and vitals reviewed.      Results for orders placed or performed in visit on 11/12/22   POCT Influenza A/B MOLECULAR   Result Value Ref Range    POC Molecular Influenza A Ag Negative Negative, Not Reported    POC Molecular Influenza B Ag Negative Negative, Not Reported     Acceptable Yes    POCT Strep A, Molecular   Result Value Ref Range    Molecular Strep A, POC Negative Negative     Acceptable Yes        Assessment:       1. Sore throat    2. Nasal congestion    3. Allergic rhinitis, unspecified seasonality, unspecified trigger           Plan:         Sore throat  -     POCT Influenza A/B MOLECULAR  -     POCT Strep A, Molecular  -     (Magic mouthwash) 1:1:1 diphenhydrAMINE(Benadryl) 12.5mg/5ml liq, aluminum & magnesium hydroxide-simethicone (Maalox), LIDOcaine viscous 2%; Swish and spit 10 mLs every 4 (four) hours as needed (sore throat).  Dispense: 180 mL; Refill: 0    Nasal congestion    Allergic rhinitis, unspecified seasonality, unspecified trigger  -     fluticasone propionate (FLONASE) 50 mcg/actuation nasal spray; 2 sprays (100 mcg total) by Each Nostril route once daily.  Dispense: 18.2 mL; Refill: 0

## 2022-12-28 ENCOUNTER — TELEPHONE (OUTPATIENT)
Dept: FAMILY MEDICINE | Facility: CLINIC | Age: 27
End: 2022-12-28
Payer: OTHER GOVERNMENT

## 2022-12-28 NOTE — TELEPHONE ENCOUNTER
----- Message from Satinder Thakur sent at 12/27/2022  8:42 AM CST -----  Type:  Sooner Appointment Request    Caller is requesting a sooner appointment.  Caller declined first available appointment listed below.  Caller will not accept being placed on the waitlist and is requesting a message be sent to doctor.    Name of Caller:  pt  When is the first available appointment?  1/30--please call and advise--said she need to be seen sooner  Symptoms:  est care/bad migraine/ fatigue  Best Call Back Number:[phone]    Additional Information:  thank you

## 2023-01-03 ENCOUNTER — OFFICE VISIT (OUTPATIENT)
Dept: FAMILY MEDICINE | Facility: CLINIC | Age: 28
End: 2023-01-03
Payer: OTHER GOVERNMENT

## 2023-01-03 ENCOUNTER — LAB VISIT (OUTPATIENT)
Dept: LAB | Facility: HOSPITAL | Age: 28
End: 2023-01-03
Attending: PHYSICIAN ASSISTANT
Payer: OTHER GOVERNMENT

## 2023-01-03 VITALS
BODY MASS INDEX: 21.7 KG/M2 | HEIGHT: 62 IN | WEIGHT: 117.94 LBS | SYSTOLIC BLOOD PRESSURE: 116 MMHG | HEART RATE: 74 BPM | OXYGEN SATURATION: 98 % | DIASTOLIC BLOOD PRESSURE: 76 MMHG

## 2023-01-03 DIAGNOSIS — R53.83 FATIGUE, UNSPECIFIED TYPE: ICD-10-CM

## 2023-01-03 DIAGNOSIS — R51.9 NEW ONSET OF HEADACHES: Primary | ICD-10-CM

## 2023-01-03 DIAGNOSIS — R51.9 NEW ONSET OF HEADACHES: ICD-10-CM

## 2023-01-03 DIAGNOSIS — N92.0 MENORRHAGIA WITH REGULAR CYCLE: ICD-10-CM

## 2023-01-03 LAB
BASOPHILS # BLD AUTO: 0.01 K/UL (ref 0–0.2)
BASOPHILS NFR BLD: 0.3 % (ref 0–1.9)
DIFFERENTIAL METHOD: ABNORMAL
EOSINOPHIL # BLD AUTO: 0.1 K/UL (ref 0–0.5)
EOSINOPHIL NFR BLD: 3.9 % (ref 0–8)
ERYTHROCYTE [DISTWIDTH] IN BLOOD BY AUTOMATED COUNT: 12.2 % (ref 11.5–14.5)
HCT VFR BLD AUTO: 39.1 % (ref 37–48.5)
HGB BLD-MCNC: 12.8 G/DL (ref 12–16)
IMM GRANULOCYTES # BLD AUTO: 0.01 K/UL (ref 0–0.04)
IMM GRANULOCYTES NFR BLD AUTO: 0.3 % (ref 0–0.5)
LYMPHOCYTES # BLD AUTO: 1.3 K/UL (ref 1–4.8)
LYMPHOCYTES NFR BLD: 38.5 % (ref 18–48)
MCH RBC QN AUTO: 31.4 PG (ref 27–31)
MCHC RBC AUTO-ENTMCNC: 32.7 G/DL (ref 32–36)
MCV RBC AUTO: 96 FL (ref 82–98)
MONOCYTES # BLD AUTO: 0.3 K/UL (ref 0.3–1)
MONOCYTES NFR BLD: 8.8 % (ref 4–15)
NEUTROPHILS # BLD AUTO: 1.6 K/UL (ref 1.8–7.7)
NEUTROPHILS NFR BLD: 48.2 % (ref 38–73)
NRBC BLD-RTO: 0 /100 WBC
PLATELET # BLD AUTO: 187 K/UL (ref 150–450)
PMV BLD AUTO: 12.9 FL (ref 9.2–12.9)
RBC # BLD AUTO: 4.07 M/UL (ref 4–5.4)
WBC # BLD AUTO: 3.3 K/UL (ref 3.9–12.7)

## 2023-01-03 PROCEDURE — 99999 PR PBB SHADOW E&M-EST. PATIENT-LVL IV: ICD-10-PCS | Mod: PBBFAC,,, | Performed by: PHYSICIAN ASSISTANT

## 2023-01-03 PROCEDURE — 99204 PR OFFICE/OUTPT VISIT, NEW, LEVL IV, 45-59 MIN: ICD-10-PCS | Mod: S$PBB,,, | Performed by: PHYSICIAN ASSISTANT

## 2023-01-03 PROCEDURE — 99204 OFFICE O/P NEW MOD 45 MIN: CPT | Mod: S$PBB,,, | Performed by: PHYSICIAN ASSISTANT

## 2023-01-03 PROCEDURE — 82728 ASSAY OF FERRITIN: CPT | Performed by: PHYSICIAN ASSISTANT

## 2023-01-03 PROCEDURE — 85025 COMPLETE CBC W/AUTO DIFF WBC: CPT | Performed by: PHYSICIAN ASSISTANT

## 2023-01-03 PROCEDURE — 80053 COMPREHEN METABOLIC PANEL: CPT | Performed by: PHYSICIAN ASSISTANT

## 2023-01-03 PROCEDURE — 84466 ASSAY OF TRANSFERRIN: CPT | Performed by: PHYSICIAN ASSISTANT

## 2023-01-03 PROCEDURE — 99999 PR PBB SHADOW E&M-EST. PATIENT-LVL IV: CPT | Mod: PBBFAC,,, | Performed by: PHYSICIAN ASSISTANT

## 2023-01-03 PROCEDURE — 99214 OFFICE O/P EST MOD 30 MIN: CPT | Mod: PBBFAC,PO | Performed by: PHYSICIAN ASSISTANT

## 2023-01-03 PROCEDURE — 36415 COLL VENOUS BLD VENIPUNCTURE: CPT | Mod: PO | Performed by: PHYSICIAN ASSISTANT

## 2023-01-03 PROCEDURE — 80061 LIPID PANEL: CPT | Performed by: PHYSICIAN ASSISTANT

## 2023-01-03 PROCEDURE — 84443 ASSAY THYROID STIM HORMONE: CPT | Performed by: PHYSICIAN ASSISTANT

## 2023-01-03 NOTE — PROGRESS NOTES
"Subjective:      Patient ID: Sherice De Santiago is a 27 y.o. female.    Chief Complaint: Establish Care (Needs new pcp; having reoccurring migraines since stopping breast feeding 3 months ago )    Patient is new to me and clinic.    HPI  Patient has not had a recent PCP.    Patient reports headaches with nausea once to twice a week for 6-7 hours since stopping breastfeeding three months.  Takes exedrin with some help.  Reports photophobia and vision disturbances during migraine.  Started pristiq when stopping breastfeeding.  Never had these headaches before.    Heavy periods.  History of anemia.    Reviewed past medical, surgical, social, and family history with patient.    Review of Systems   Constitutional:  Negative for appetite change, chills and fever.   Eyes:  Positive for photophobia and visual disturbance.   Respiratory:  Negative for shortness of breath.    Cardiovascular:  Negative for chest pain.   Gastrointestinal:  Positive for nausea. Negative for vomiting.   Neurological:  Positive for headaches.   Psychiatric/Behavioral:  Negative for dysphoric mood, sleep disturbance and suicidal ideas. The patient is nervous/anxious.        Objective:   /76   Pulse 74   Ht 5' 2" (1.575 m)   Wt 53.5 kg (117 lb 15.1 oz)   SpO2 98%   BMI 21.57 kg/m²     Physical Exam  Vitals reviewed.   Constitutional:       Appearance: Normal appearance. She is well-developed.   HENT:      Head: Normocephalic and atraumatic.      Right Ear: External ear normal.      Left Ear: External ear normal.   Eyes:      Extraocular Movements: Extraocular movements intact.      Conjunctiva/sclera: Conjunctivae normal.   Cardiovascular:      Rate and Rhythm: Normal rate and regular rhythm.      Heart sounds: Normal heart sounds. No murmur heard.    No friction rub. No gallop.   Pulmonary:      Effort: Pulmonary effort is normal. No respiratory distress.      Breath sounds: Normal breath sounds. No wheezing, rhonchi or rales. "   Musculoskeletal:         General: Normal range of motion.   Skin:     General: Skin is warm and dry.      Findings: No rash.   Neurological:      General: No focal deficit present.      Mental Status: She is alert and oriented to person, place, and time.      Comments: Neuro and strength exam are normal and symmetrical.   Psychiatric:         Mood and Affect: Mood normal.         Behavior: Behavior normal.         Judgment: Judgment normal.     Assessment:      1. New onset of headaches    2. Fatigue, unspecified type    3. Menorrhagia with regular cycle       Plan:   1. New onset of headaches  Bloodwork today.  - CBC Auto Differential; Future  - Comprehensive Metabolic Panel; Future  - Lipid Panel; Future  - TSH; Future  - Iron and TIBC; Future  - FERRITIN; Future    2. Fatigue, unspecified type  - CBC Auto Differential; Future  - Iron and TIBC; Future  - FERRITIN; Future    3. Menorrhagia with regular cycle  - Ambulatory referral/consult to Obstetrics / Gynecology; Future    Follow up in 2 weeks with me.  Patient agreed with plan and expressed understanding.    Thank you for allowing me to serve you,

## 2023-01-04 LAB
ALBUMIN SERPL BCP-MCNC: 4.2 G/DL (ref 3.5–5.2)
ALP SERPL-CCNC: 58 U/L (ref 55–135)
ALT SERPL W/O P-5'-P-CCNC: 17 U/L (ref 10–44)
ANION GAP SERPL CALC-SCNC: 10 MMOL/L (ref 8–16)
AST SERPL-CCNC: 23 U/L (ref 10–40)
BILIRUB SERPL-MCNC: 1.4 MG/DL (ref 0.1–1)
BUN SERPL-MCNC: 15 MG/DL (ref 6–20)
CALCIUM SERPL-MCNC: 9.2 MG/DL (ref 8.7–10.5)
CHLORIDE SERPL-SCNC: 105 MMOL/L (ref 95–110)
CHOLEST SERPL-MCNC: 161 MG/DL (ref 120–199)
CHOLEST/HDLC SERPL: 2.1 {RATIO} (ref 2–5)
CO2 SERPL-SCNC: 21 MMOL/L (ref 23–29)
CREAT SERPL-MCNC: 0.8 MG/DL (ref 0.5–1.4)
EST. GFR  (NO RACE VARIABLE): >60 ML/MIN/1.73 M^2
FERRITIN SERPL-MCNC: 8 NG/ML (ref 20–300)
GLUCOSE SERPL-MCNC: 115 MG/DL (ref 70–110)
HDLC SERPL-MCNC: 78 MG/DL (ref 40–75)
HDLC SERPL: 48.4 % (ref 20–50)
IRON SERPL-MCNC: 153 UG/DL (ref 30–160)
LDLC SERPL CALC-MCNC: 68.4 MG/DL (ref 63–159)
NONHDLC SERPL-MCNC: 83 MG/DL
POTASSIUM SERPL-SCNC: 4.6 MMOL/L (ref 3.5–5.1)
PROT SERPL-MCNC: 6.9 G/DL (ref 6–8.4)
SATURATED IRON: 38 % (ref 20–50)
SODIUM SERPL-SCNC: 136 MMOL/L (ref 136–145)
TOTAL IRON BINDING CAPACITY: 408 UG/DL (ref 250–450)
TRANSFERRIN SERPL-MCNC: 276 MG/DL (ref 200–375)
TRIGL SERPL-MCNC: 73 MG/DL (ref 30–150)
TSH SERPL DL<=0.005 MIU/L-ACNC: 1.09 UIU/ML (ref 0.4–4)

## 2023-01-17 ENCOUNTER — OFFICE VISIT (OUTPATIENT)
Dept: FAMILY MEDICINE | Facility: CLINIC | Age: 28
End: 2023-01-17
Payer: OTHER GOVERNMENT

## 2023-01-17 VITALS
SYSTOLIC BLOOD PRESSURE: 104 MMHG | HEART RATE: 61 BPM | HEIGHT: 62 IN | DIASTOLIC BLOOD PRESSURE: 64 MMHG | WEIGHT: 116.63 LBS | BODY MASS INDEX: 21.46 KG/M2 | OXYGEN SATURATION: 96 %

## 2023-01-17 DIAGNOSIS — D50.9 IRON DEFICIENCY ANEMIA, UNSPECIFIED IRON DEFICIENCY ANEMIA TYPE: Primary | ICD-10-CM

## 2023-01-17 PROCEDURE — 99999 PR PBB SHADOW E&M-EST. PATIENT-LVL III: CPT | Mod: PBBFAC,,, | Performed by: PHYSICIAN ASSISTANT

## 2023-01-17 PROCEDURE — 99213 PR OFFICE/OUTPT VISIT, EST, LEVL III, 20-29 MIN: ICD-10-PCS | Mod: S$PBB,,, | Performed by: PHYSICIAN ASSISTANT

## 2023-01-17 PROCEDURE — 99213 OFFICE O/P EST LOW 20 MIN: CPT | Mod: PBBFAC,PO | Performed by: PHYSICIAN ASSISTANT

## 2023-01-17 PROCEDURE — 99999 PR PBB SHADOW E&M-EST. PATIENT-LVL III: ICD-10-PCS | Mod: PBBFAC,,, | Performed by: PHYSICIAN ASSISTANT

## 2023-01-17 PROCEDURE — 99213 OFFICE O/P EST LOW 20 MIN: CPT | Mod: S$PBB,,, | Performed by: PHYSICIAN ASSISTANT

## 2023-01-17 RX ORDER — DESVENLAFAXINE 100 MG/1
100 TABLET, EXTENDED RELEASE ORAL DAILY
Qty: 90 TABLET | Refills: 2 | Status: SHIPPED | OUTPATIENT
Start: 2023-01-17 | End: 2024-01-17

## 2023-01-17 NOTE — PROGRESS NOTES
"Subjective:      Patient ID: Sherice De Santiago is a 27 y.o. female.    Chief Complaint: Follow-up (Review labs)    HPI  Patient saw me 1/3/2023 with new onset of headaches, fatigue, and menorrhagia with regular cycle.  Bloodwork revealed anemia.    Went over patient's labwork with her that reveals mild anemia.  Has not started iron yet.  Headaches are decreasing-2 in the last 2 weeks.  Continued fatigue.    Review of Systems   Constitutional:  Positive for fatigue. Negative for appetite change, chills and fever.   Respiratory:  Negative for shortness of breath.    Cardiovascular:  Negative for chest pain and palpitations.   Neurological:  Positive for headaches (decreasing).   Psychiatric/Behavioral:  Negative for sleep disturbance.        Objective:   /64   Pulse 61   Ht 5' 2" (1.575 m)   Wt 52.9 kg (116 lb 10 oz)   SpO2 96%   BMI 21.33 kg/m²     Physical Exam  Vitals reviewed.   Constitutional:       Appearance: Normal appearance. She is well-developed.   HENT:      Head: Normocephalic and atraumatic.      Right Ear: External ear normal.      Left Ear: External ear normal.   Eyes:      Conjunctiva/sclera: Conjunctivae normal.   Cardiovascular:      Rate and Rhythm: Normal rate and regular rhythm.      Heart sounds: Normal heart sounds. No murmur heard.    No friction rub. No gallop.   Pulmonary:      Effort: Pulmonary effort is normal. No respiratory distress.      Breath sounds: Normal breath sounds. No wheezing, rhonchi or rales.   Musculoskeletal:         General: Normal range of motion.   Skin:     General: Skin is warm and dry.      Findings: No rash.   Neurological:      General: No focal deficit present.      Mental Status: She is alert and oriented to person, place, and time.   Psychiatric:         Mood and Affect: Mood normal.         Behavior: Behavior normal.         Judgment: Judgment normal.     Assessment:      1. Iron deficiency anemia, unspecified iron deficiency anemia type    2. " Postpartum depression       Plan:   1. Iron deficiency anemia, unspecified iron deficiency anemia type  Scheduled before next appt.  Advised to start iron every other day with vitamin C.  - CBC Auto Differential; Future  - Iron and TIBC; Future  - FERRITIN; Future    2. Postpartum depression  - REFILLED desvenlafaxine succinate (PRISTIQ) 100 MG Tb24; Take 1 tablet (100 mg total) by mouth once daily.  Dispense: 90 tablet; Refill: 2    Follow up in 3 months with new PCP.  Patient agreed with plan and expressed understanding.    Thank you for allowing me to serve you,

## 2023-02-04 ENCOUNTER — OFFICE VISIT (OUTPATIENT)
Dept: URGENT CARE | Facility: CLINIC | Age: 28
End: 2023-02-04
Payer: OTHER GOVERNMENT

## 2023-02-04 VITALS
DIASTOLIC BLOOD PRESSURE: 70 MMHG | BODY MASS INDEX: 21.35 KG/M2 | TEMPERATURE: 99 F | SYSTOLIC BLOOD PRESSURE: 111 MMHG | HEART RATE: 83 BPM | WEIGHT: 116 LBS | HEIGHT: 62 IN | OXYGEN SATURATION: 99 %

## 2023-02-04 DIAGNOSIS — J01.90 ACUTE NON-RECURRENT SINUSITIS, UNSPECIFIED LOCATION: Primary | ICD-10-CM

## 2023-02-04 PROCEDURE — 99203 PR OFFICE/OUTPT VISIT, NEW, LEVL III, 30-44 MIN: ICD-10-PCS | Mod: S$GLB,,, | Performed by: EMERGENCY MEDICINE

## 2023-02-04 PROCEDURE — 99203 OFFICE O/P NEW LOW 30 MIN: CPT | Mod: S$GLB,,, | Performed by: EMERGENCY MEDICINE

## 2023-02-04 RX ORDER — PREDNISONE 20 MG/1
TABLET ORAL
Qty: 6 TABLET | Refills: 0 | Status: SHIPPED | OUTPATIENT
Start: 2023-02-04 | End: 2023-02-07

## 2023-02-04 RX ORDER — CEFDINIR 300 MG/1
300 CAPSULE ORAL 2 TIMES DAILY
Qty: 10 CAPSULE | Refills: 0 | Status: SHIPPED | OUTPATIENT
Start: 2023-02-04 | End: 2023-02-07

## 2023-02-04 NOTE — PROGRESS NOTES
"Subjective:       Patient ID: Sherice De Santiago is a 27 y.o. female.    Vitals:  height is 5' 2" (1.575 m) and weight is 52.6 kg (116 lb). Her temperature is 98.7 °F (37.1 °C). Her blood pressure is 111/70 and her pulse is 83. Her oxygen saturation is 99%.     Chief Complaint: Sore Throat    Headaches, congestion, post nasal drip and sore throat for approximately 7-8 days   Patient has been taking OTC medications with no real relief.       Other  This is a new problem. The current episode started 1 to 4 weeks ago. The problem occurs constantly. The problem has been gradually worsening. Associated symptoms include congestion, coughing and a sore throat. Treatments tried: OTC meds. The treatment provided no relief.     HENT:  Positive for congestion and sore throat.    Respiratory:  Positive for cough.      Objective:      Physical Exam   Constitutional: She is oriented to person, place, and time. She appears well-developed. She is cooperative.  Non-toxic appearance. She does not appear ill. No distress.   HENT:   Head: Normocephalic and atraumatic.   Ears:   Right Ear: Hearing and external ear normal.   Left Ear: Hearing and external ear normal.      Comments: TMs retracted bilaterally.  Nose: Congestion present. No mucosal edema, rhinorrhea or nasal deformity. No epistaxis. Right sinus exhibits no maxillary sinus tenderness and no frontal sinus tenderness. Left sinus exhibits no maxillary sinus tenderness and no frontal sinus tenderness.   Mouth/Throat: Uvula is midline, oropharynx is clear and moist and mucous membranes are normal. No trismus in the jaw. Normal dentition. No uvula swelling. No oropharyngeal exudate, posterior oropharyngeal edema or posterior oropharyngeal erythema.      Comments: Posterior pharyngeal erythema with postnasal drip present.  Eyes: Conjunctivae and lids are normal. No scleral icterus.   Neck: Trachea normal and phonation normal. Neck supple. No edema present. No erythema present. No " neck rigidity present.   Cardiovascular: Normal rate, regular rhythm, normal heart sounds and normal pulses.   Pulmonary/Chest: Effort normal and breath sounds normal. No respiratory distress. She has no decreased breath sounds. She has no wheezes. She has no rhonchi. She has no rales.   Abdominal: Normal appearance.   Musculoskeletal: Normal range of motion.         General: No deformity. Normal range of motion.   Neurological: She is alert and oriented to person, place, and time. She exhibits normal muscle tone. Coordination normal.   Skin: Skin is warm, dry, intact, not diaphoretic and not pale.   Psychiatric: Her speech is normal and behavior is normal. Judgment and thought content normal.   Nursing note and vitals reviewed.        The patient is a 27-year-old with an approximate 9-10 day history of URI viral type symptoms.  The patient has not developed significant cough, hoarseness, postnasal drip, sore throat and bilateral earache.  She developed fever yesterday.  Will treat as possible secondary bacterial sinus infection with antibiotic and steroid.  Assessment:       1. Acute non-recurrent sinusitis, unspecified location          Plan:         Acute non-recurrent sinusitis, unspecified location  -     cefdinir (OMNICEF) 300 MG capsule; Take 1 capsule (300 mg total) by mouth 2 (two) times daily. for 5 days  Dispense: 10 capsule; Refill: 0  -     predniSONE (DELTASONE) 20 MG tablet; Take 1 tablet (20 mg total) by mouth 2 (two) times daily for 2 days, THEN 1 tablet (20 mg total) once daily for 2 days.  Dispense: 6 tablet; Refill: 0

## 2023-02-07 ENCOUNTER — OFFICE VISIT (OUTPATIENT)
Dept: FAMILY MEDICINE | Facility: CLINIC | Age: 28
End: 2023-02-07
Payer: OTHER GOVERNMENT

## 2023-02-07 VITALS
SYSTOLIC BLOOD PRESSURE: 110 MMHG | DIASTOLIC BLOOD PRESSURE: 68 MMHG | HEIGHT: 62 IN | WEIGHT: 115.5 LBS | HEART RATE: 70 BPM | BODY MASS INDEX: 21.25 KG/M2 | OXYGEN SATURATION: 98 %

## 2023-02-07 DIAGNOSIS — Z00.00 PREVENTATIVE HEALTH CARE: Primary | ICD-10-CM

## 2023-02-07 LAB
BACTERIA #/AREA URNS HPF: ABNORMAL /HPF
BILIRUB UR QL STRIP: NEGATIVE
CLARITY UR: ABNORMAL
COLOR UR: ABNORMAL
GLUCOSE UR QL STRIP: NEGATIVE
HGB UR QL STRIP: ABNORMAL
KETONES UR QL STRIP: NEGATIVE
LEUKOCYTE ESTERASE UR QL STRIP: NEGATIVE
MICROSCOPIC COMMENT: ABNORMAL
NITRITE UR QL STRIP: NEGATIVE
PH UR STRIP: 7 [PH] (ref 5–8)
PROT UR QL STRIP: ABNORMAL
RBC #/AREA URNS HPF: >100 /HPF (ref 0–4)
SP GR UR STRIP: 1.01 (ref 1–1.03)
SQUAMOUS #/AREA URNS HPF: 1 /HPF
URN SPEC COLLECT METH UR: ABNORMAL

## 2023-02-07 PROCEDURE — 99395 PREV VISIT EST AGE 18-39: CPT | Mod: S$PBB,,, | Performed by: PHYSICIAN ASSISTANT

## 2023-02-07 PROCEDURE — 99999 PR PBB SHADOW E&M-EST. PATIENT-LVL III: CPT | Mod: PBBFAC,,, | Performed by: PHYSICIAN ASSISTANT

## 2023-02-07 PROCEDURE — 99999 PR PBB SHADOW E&M-EST. PATIENT-LVL III: ICD-10-PCS | Mod: PBBFAC,,, | Performed by: PHYSICIAN ASSISTANT

## 2023-02-07 PROCEDURE — 99213 OFFICE O/P EST LOW 20 MIN: CPT | Mod: PBBFAC,PO | Performed by: PHYSICIAN ASSISTANT

## 2023-02-07 PROCEDURE — 99395 PR PREVENTIVE VISIT,EST,18-39: ICD-10-PCS | Mod: S$PBB,,, | Performed by: PHYSICIAN ASSISTANT

## 2023-02-07 PROCEDURE — 81000 URINALYSIS NONAUTO W/SCOPE: CPT | Mod: PO | Performed by: PHYSICIAN ASSISTANT

## 2023-02-07 NOTE — PROGRESS NOTES
"Subjective:      Patient ID: Sherice De Santiago is a 27 y.o. female.    Chief Complaint: Annual Exam    HPI  Patient has PMH of iron deficiency anemia and postpartum depression.    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  No new health complaints today.    Having a migraine almost once a week-has decreased.    Review of Systems   Constitutional:  Negative for appetite change, chills and fever.   HENT:  Negative for ear pain and sore throat.    Eyes:  Negative for pain.   Respiratory:  Negative for cough and shortness of breath.    Cardiovascular:  Negative for chest pain.   Gastrointestinal:  Negative for abdominal pain, blood in stool, constipation, diarrhea, nausea and vomiting.   Genitourinary:  Negative for dysuria, frequency and hematuria.   Musculoskeletal:  Negative for myalgias.   Skin:  Negative for rash.   Neurological:  Positive for headaches (baseline). Negative for dizziness and light-headedness.   Psychiatric/Behavioral:  Negative for sleep disturbance.        Objective:   /68   Pulse 70   Ht 5' 2" (1.575 m)   Wt 52.4 kg (115 lb 8.3 oz)   LMP 01/14/2023 (Exact Date)   SpO2 98%   BMI 21.13 kg/m²     Physical Exam  Vitals reviewed.   Constitutional:       General: She is not in acute distress.     Appearance: Normal appearance. She is well-developed and well-groomed.   HENT:      Head: Normocephalic and atraumatic.      Right Ear: Hearing, tympanic membrane, ear canal and external ear normal.      Left Ear: Hearing, tympanic membrane, ear canal and external ear normal.      Nose: Nose normal.      Right Sinus: No maxillary sinus tenderness or frontal sinus tenderness.      Left Sinus: No maxillary sinus tenderness or frontal sinus tenderness.      Mouth/Throat:      Lips: Pink.      Mouth: Mucous membranes are moist.      Pharynx: Oropharynx is clear.   Eyes:      General: Lids are normal.      Conjunctiva/sclera: Conjunctivae normal.   Neck:      " Trachea: Phonation normal.   Cardiovascular:      Rate and Rhythm: Normal rate and regular rhythm.      Heart sounds: Normal heart sounds. No murmur heard.   No friction rub. No gallop.    Pulmonary:      Effort: Pulmonary effort is normal. No respiratory distress.      Breath sounds: Normal breath sounds. No decreased breath sounds, wheezing, rhonchi or rales.   Abdominal:      General: Bowel sounds are normal.      Palpations: Abdomen is soft.      Tenderness: There is no abdominal tenderness.   Musculoskeletal:         General: Normal range of motion.      Cervical back: Normal range of motion.   Lymphadenopathy:      Head:      Right side of head: No submental, submandibular, tonsillar, preauricular, posterior auricular or occipital adenopathy.      Left side of head: No submental, submandibular, tonsillar, preauricular, posterior auricular or occipital adenopathy.      Cervical: No cervical adenopathy.   Skin:     General: Skin is warm and dry.      Findings: No rash.   Neurological:      General: No focal deficit present.      Mental Status: She is alert and oriented to person, place, and time.   Psychiatric:         Attention and Perception: Attention normal.         Mood and Affect: Mood normal.         Speech: Speech normal.         Behavior: Behavior normal. Behavior is cooperative.           Cognition and Memory: Cognition normal.         Judgment: Judgment normal.      Assessment:      1. Preventative health care       Plan:   1. Preventative health care  Filled out paperwork for school.  - RPR; Future  - Urinalysis, Reflex to Urine Culture Urine, Clean Catch  - POCT TB Skin Test    Follow up as needed.  Patient agreed with plan and expressed understanding.    Thank you for allowing me to serve you,

## 2023-03-10 ENCOUNTER — TELEPHONE (OUTPATIENT)
Dept: FAMILY MEDICINE | Facility: CLINIC | Age: 28
End: 2023-03-10
Payer: OTHER GOVERNMENT

## 2023-03-10 NOTE — TELEPHONE ENCOUNTER
Called patient and left a voice message to contact the office to schedule her nurse visit for tb skin test.

## 2023-03-10 NOTE — TELEPHONE ENCOUNTER
----- Message from Maximus Wilde sent at 3/10/2023  9:45 AM CST -----  Type:  Sooner Appointment Request    Caller is requesting a sooner appointment.  Caller declined first available appointment listed below.  Caller will not accept being placed on the waitlist and is requesting a message be sent to doctor.    Name of Caller:  Patient  When is the first available appointment?  Nurse Visit  Symptoms:  TB test  Best Call Back Number:  [phone]  Additional Information:

## 2023-03-14 ENCOUNTER — CLINICAL SUPPORT (OUTPATIENT)
Dept: FAMILY MEDICINE | Facility: CLINIC | Age: 28
End: 2023-03-14
Payer: OTHER GOVERNMENT

## 2023-03-14 DIAGNOSIS — Z11.1 ENCOUNTER FOR PPD TEST: Primary | ICD-10-CM

## 2023-03-14 PROCEDURE — 99499 NO LOS: ICD-10-PCS | Mod: S$PBB,,, | Performed by: PHYSICIAN ASSISTANT

## 2023-03-14 PROCEDURE — 99499 UNLISTED E&M SERVICE: CPT | Mod: S$PBB,,, | Performed by: PHYSICIAN ASSISTANT

## 2023-03-14 PROCEDURE — 86580 TB INTRADERMAL TEST: CPT | Mod: PBBFAC,PO

## 2023-03-14 NOTE — PROGRESS NOTES
Tuberculin skin test applied to left ventral forearm. Explained how to read the test, measuring induration not just erythema; she will come into office if test appears positive.  Pt. Vu.

## 2023-03-16 ENCOUNTER — CLINICAL SUPPORT (OUTPATIENT)
Dept: FAMILY MEDICINE | Facility: CLINIC | Age: 28
End: 2023-03-16
Payer: OTHER GOVERNMENT

## 2023-03-16 DIAGNOSIS — Z11.1 ENCOUNTER FOR PPD TEST: Primary | ICD-10-CM

## 2023-03-16 LAB
TB INDURATION - 48 HR READ: 0 MM
TB INDURATION - 72 HR READ: NORMAL
TB SKIN TEST - 48 HR READ: NEGATIVE
TB SKIN TEST - 72 HR READ: NORMAL

## 2023-04-04 ENCOUNTER — PATIENT OUTREACH (OUTPATIENT)
Dept: ADMINISTRATIVE | Facility: HOSPITAL | Age: 28
End: 2023-04-04
Payer: OTHER GOVERNMENT

## 2023-04-04 ENCOUNTER — PATIENT MESSAGE (OUTPATIENT)
Dept: ADMINISTRATIVE | Facility: HOSPITAL | Age: 28
End: 2023-04-04
Payer: OTHER GOVERNMENT

## 2023-04-13 ENCOUNTER — OFFICE VISIT (OUTPATIENT)
Dept: URGENT CARE | Facility: CLINIC | Age: 28
End: 2023-04-13
Payer: OTHER GOVERNMENT

## 2023-04-13 VITALS
RESPIRATION RATE: 16 BRPM | HEART RATE: 108 BPM | BODY MASS INDEX: 21.16 KG/M2 | HEIGHT: 62 IN | SYSTOLIC BLOOD PRESSURE: 102 MMHG | OXYGEN SATURATION: 99 % | WEIGHT: 115 LBS | TEMPERATURE: 100 F | DIASTOLIC BLOOD PRESSURE: 60 MMHG

## 2023-04-13 DIAGNOSIS — R50.9 FEVER, UNSPECIFIED FEVER CAUSE: Primary | ICD-10-CM

## 2023-04-13 DIAGNOSIS — J02.9 SORE THROAT: ICD-10-CM

## 2023-04-13 DIAGNOSIS — R09.81 NASAL CONGESTION: ICD-10-CM

## 2023-04-13 LAB
CTP QC/QA: YES
CTP QC/QA: YES
MOLECULAR STREP A: NEGATIVE
SARS-COV-2 AG RESP QL IA.RAPID: NEGATIVE

## 2023-04-13 PROCEDURE — 87811 SARS CORONAVIRUS 2 ANTIGEN POCT, MANUAL READ: ICD-10-PCS | Mod: QW,S$GLB,, | Performed by: PHYSICIAN ASSISTANT

## 2023-04-13 PROCEDURE — 99213 OFFICE O/P EST LOW 20 MIN: CPT | Mod: S$GLB,,, | Performed by: PHYSICIAN ASSISTANT

## 2023-04-13 PROCEDURE — 99213 PR OFFICE/OUTPT VISIT, EST, LEVL III, 20-29 MIN: ICD-10-PCS | Mod: S$GLB,,, | Performed by: PHYSICIAN ASSISTANT

## 2023-04-13 PROCEDURE — 87651 STREP A DNA AMP PROBE: CPT | Mod: QW,S$GLB,, | Performed by: PHYSICIAN ASSISTANT

## 2023-04-13 PROCEDURE — 87651 POCT STREP A MOLECULAR: ICD-10-PCS | Mod: QW,S$GLB,, | Performed by: PHYSICIAN ASSISTANT

## 2023-04-13 PROCEDURE — 87811 SARS-COV-2 COVID19 W/OPTIC: CPT | Mod: QW,S$GLB,, | Performed by: PHYSICIAN ASSISTANT

## 2023-04-13 RX ORDER — AZELASTINE 1 MG/ML
1 SPRAY, METERED NASAL 2 TIMES DAILY PRN
Qty: 30 ML | Refills: 3 | Status: SHIPPED | OUTPATIENT
Start: 2023-04-13 | End: 2023-06-02

## 2023-04-13 RX ORDER — FLUTICASONE PROPIONATE 50 MCG
1 SPRAY, SUSPENSION (ML) NASAL 2 TIMES DAILY
Qty: 16 ML | Refills: 3 | Status: SHIPPED | OUTPATIENT
Start: 2023-04-13 | End: 2023-06-02

## 2023-04-13 NOTE — PROGRESS NOTES
"Subjective:      Patient ID: Sherice De Santiago is a 27 y.o. female.    Vitals:  height is 5' 2" (1.575 m) and weight is 52.2 kg (115 lb). Her temperature is 99.6 °F (37.6 °C). Her blood pressure is 102/60 and her pulse is 108. Her respiration is 16 and oxygen saturation is 99%.     Chief Complaint: Cough    Other  This is a new problem. The current episode started in the past 7 days (3 days). The problem occurs constantly. The problem has been gradually worsening. Associated symptoms include congestion, coughing, a fever, myalgias and a sore throat. Pertinent negatives include no abdominal pain, anorexia, arthralgias, change in bowel habit, chest pain, chills, diaphoresis, fatigue, headaches, joint swelling, nausea, neck pain, numbness, rash, swollen glands, urinary symptoms, visual change, vomiting or weakness. Nothing aggravates the symptoms. She has tried acetaminophen and NSAIDs (OTC dayquil) for the symptoms. The treatment provided mild relief.     Constitution: Positive for fever. Negative for chills, sweating and fatigue.   HENT:  Positive for congestion, postnasal drip, sinus pressure and sore throat. Negative for ear pain, drooling, nosebleeds, foreign body in nose, sinus pain, trouble swallowing and voice change.    Neck: Negative for neck pain, neck stiffness, painful lymph nodes and neck swelling.   Cardiovascular:  Negative for chest pain, leg swelling, palpitations, sob on exertion and passing out.   Eyes:  Negative for eye pain, eye redness, photophobia, double vision, blurred vision and eyelid swelling.   Respiratory:  Positive for cough. Negative for chest tightness, sputum production, bloody sputum, shortness of breath, stridor and wheezing.    Gastrointestinal:  Negative for abdominal pain, abdominal bloating, nausea, vomiting, constipation, diarrhea and heartburn.   Genitourinary:  Negative for dysuria, flank pain and hematuria.   Musculoskeletal:  Positive for muscle ache. Negative for joint " pain, joint swelling, abnormal ROM of joint, back pain and muscle cramps.   Skin:  Negative for rash and hives.   Allergic/Immunologic: Negative for seasonal allergies, food allergies, hives, itching and sneezing.   Neurological:  Negative for dizziness, light-headedness, passing out, facial drooping, speech difficulty, loss of balance, headaches, altered mental status, loss of consciousness, numbness and seizures.   Hematologic/Lymphatic: Negative for swollen lymph nodes.   Psychiatric/Behavioral:  Negative for altered mental status and nervous/anxious. The patient is not nervous/anxious.     Objective:     Physical Exam   Constitutional: She is oriented to person, place, and time. She appears well-developed. She is cooperative.  Non-toxic appearance. She does not appear ill. No distress.   HENT:   Head: Normocephalic and atraumatic.   Ears:   Right Ear: Hearing, tympanic membrane, external ear and ear canal normal.   Left Ear: Hearing, tympanic membrane, external ear and ear canal normal.   Nose: Mucosal edema and rhinorrhea present. No nasal deformity. No epistaxis. Right sinus exhibits no maxillary sinus tenderness and no frontal sinus tenderness. Left sinus exhibits no maxillary sinus tenderness and no frontal sinus tenderness.   Mouth/Throat: Uvula is midline and mucous membranes are normal. No trismus in the jaw. Normal dentition. No uvula swelling. Posterior oropharyngeal erythema and cobblestoning present. No oropharyngeal exudate, posterior oropharyngeal edema or tonsillar abscesses. No tonsillar exudate.   Eyes: Conjunctivae and lids are normal. No scleral icterus.   Neck: Trachea normal and phonation normal. Neck supple. No edema present. No erythema present. No neck rigidity present.   Cardiovascular: Normal rate, regular rhythm, normal heart sounds and normal pulses.   Pulmonary/Chest: Effort normal and breath sounds normal. No accessory muscle usage or stridor. No respiratory distress. She has no  decreased breath sounds. She has no wheezes. She has no rhonchi. She has no rales.   Abdominal: Normal appearance.   Musculoskeletal: Normal range of motion.         General: No deformity. Normal range of motion.   Lymphadenopathy:     She has no cervical adenopathy.   Neurological: She is alert and oriented to person, place, and time. She exhibits normal muscle tone. Coordination normal.   Skin: Skin is warm, dry, intact, not diaphoretic, not pale and no rash. Capillary refill takes less than 2 seconds.   Psychiatric: Her speech is normal and behavior is normal. Judgment and thought content normal.   Nursing note and vitals reviewed.    Results for orders placed or performed in visit on 04/13/23   SARS Coronavirus 2 Antigen, POCT Manual Read   Result Value Ref Range    SARS Coronavirus 2 Antigen Negative Negative     Acceptable Yes    POCT Strep A, Molecular   Result Value Ref Range    Molecular Strep A, POC Negative Negative     Acceptable Yes        Assessment:     1. Fever, unspecified fever cause    2. Sore throat    3. Nasal congestion        Plan:   Discussed COVID-19 and strep results with patient. Advised close follow-up with PCP and/or Specialist for further evaluation as needed. ER precautions given to patient as well. Patient aware, verbalized understanding and agreed with plan of care.    Fever, unspecified fever cause  -     SARS Coronavirus 2 Antigen, POCT Manual Read    Sore throat  -     POCT Strep A, Molecular    Nasal congestion  -     SARS Coronavirus 2 Antigen, POCT Manual Read  -     fluticasone propionate (FLONASE ALLERGY RELIEF) 50 mcg/actuation nasal spray; 1 spray (50 mcg total) by Each Nostril route 2 (two) times daily.  Dispense: 16 mL; Refill: 3  -     azelastine (ASTELIN) 137 mcg (0.1 %) nasal spray; 1 spray (137 mcg total) by Nasal route 2 (two) times daily as needed for Rhinitis.  Dispense: 30 mL; Refill: 3        Patient Instructions   If you were  prescribed a narcotic or controlled medication, do not drive or operate heavy equipment or machinery while taking these medications.  You must understand that you've received an Urgent Care treatment only and that you may be released before all your medical problems are known or treated. You, the patient, will arrange for follow up care as instructed.  Follow up with your PCP or specialty clinic as directed if not improved or as needed. You can call [phone] to schedule an appointment with the appropriate provider.  If your condition worsens we recommend that you receive another evaluation at the Emergency Department for any concerns or worsening of condition.  Patient aware and verbalized understanding.    Discussed COVID-19 and strep results with patient.  Increase fluids and rest is important.  Avoid contact with sick individuals.  Humidifier use at home.  OTC Robtussin as needed for cough or Delsym as discussed.  OTC Claritin or Zyrtec or Allegra (plain) daily as needed.  OTC Flonase Nasal Spray and Astelin RX as needed for nasal congestion, etc.  OTC Tylenol or Ibuprofen UNLESS CONTRAINDICATED (LIVER AND/OR KIDNEY ISSUES, ETC.) every 4 - 6 hours as needed for fever, pain, etc.  Follow-up with your PCP and/or Specialist in the next 24-72hrs or sooner for re-evaluation especially if no improvement in symptoms.  ER precautions given to patient.  Patient aware, verbalized understanding and agreed with plan of care.

## 2023-04-13 NOTE — PATIENT INSTRUCTIONS
If you were prescribed a narcotic or controlled medication, do not drive or operate heavy equipment or machinery while taking these medications.  You must understand that you've received an Urgent Care treatment only and that you may be released before all your medical problems are known or treated. You, the patient, will arrange for follow up care as instructed.  Follow up with your PCP or specialty clinic as directed if not improved or as needed. You can call [phone] to schedule an appointment with the appropriate provider.  If your condition worsens we recommend that you receive another evaluation at the Emergency Department for any concerns or worsening of condition.  Patient aware and verbalized understanding.    Discussed COVID-19 and strep results with patient.  Increase fluids and rest is important.  Avoid contact with sick individuals.  Humidifier use at home.  OTC Robtussin as needed for cough or Delsym as discussed.  OTC Claritin or Zyrtec or Allegra (plain) daily as needed.  OTC Flonase Nasal Spray and Astelin RX as needed for nasal congestion, etc.  OTC Tylenol or Ibuprofen UNLESS CONTRAINDICATED (LIVER AND/OR KIDNEY ISSUES, ETC.) every 4 - 6 hours as needed for fever, pain, etc.  Follow-up with your PCP and/or Specialist in the next 24-72hrs or sooner for re-evaluation especially if no improvement in symptoms.  ER precautions given to patient.  Patient aware, verbalized understanding and agreed with plan of care.

## 2023-04-17 PROBLEM — R56.9 SEIZURE: Status: ACTIVE | Noted: 2023-04-17

## 2023-04-17 PROBLEM — D50.9 IRON DEFICIENCY ANEMIA: Status: ACTIVE | Noted: 2023-04-17

## 2023-04-17 PROBLEM — G43.009 MIGRAINE WITHOUT AURA AND WITHOUT STATUS MIGRAINOSUS, NOT INTRACTABLE: Status: ACTIVE | Noted: 2023-04-17

## 2023-04-19 ENCOUNTER — OFFICE VISIT (OUTPATIENT)
Dept: URGENT CARE | Facility: CLINIC | Age: 28
End: 2023-04-19
Payer: OTHER GOVERNMENT

## 2023-04-19 VITALS
HEIGHT: 62 IN | DIASTOLIC BLOOD PRESSURE: 63 MMHG | WEIGHT: 115.06 LBS | SYSTOLIC BLOOD PRESSURE: 105 MMHG | OXYGEN SATURATION: 98 % | BODY MASS INDEX: 21.17 KG/M2 | TEMPERATURE: 98 F | HEART RATE: 100 BPM

## 2023-04-19 DIAGNOSIS — J01.90 ACUTE NON-RECURRENT SINUSITIS, UNSPECIFIED LOCATION: Primary | ICD-10-CM

## 2023-04-19 PROCEDURE — 99213 PR OFFICE/OUTPT VISIT, EST, LEVL III, 20-29 MIN: ICD-10-PCS | Mod: S$GLB,,, | Performed by: EMERGENCY MEDICINE

## 2023-04-19 PROCEDURE — 99213 OFFICE O/P EST LOW 20 MIN: CPT | Mod: S$GLB,,, | Performed by: EMERGENCY MEDICINE

## 2023-04-19 RX ORDER — PREDNISONE 20 MG/1
TABLET ORAL
Qty: 7 TABLET | Refills: 0 | Status: SHIPPED | OUTPATIENT
Start: 2023-04-19 | End: 2023-04-24

## 2023-04-19 RX ORDER — AMOXICILLIN 875 MG/1
875 TABLET, FILM COATED ORAL EVERY 12 HOURS
Qty: 10 TABLET | Refills: 0 | Status: SHIPPED | OUTPATIENT
Start: 2023-04-19 | End: 2023-04-24

## 2023-04-19 NOTE — PROGRESS NOTES
"Subjective:      Patient ID: Sherice De Santiago is a 27 y.o. female.    Vitals:  height is 5' 2" (1.575 m) and weight is 52.2 kg (115 lb 1.3 oz). Her temperature is 98.3 °F (36.8 °C). Her blood pressure is 105/63 and her pulse is 100. Her oxygen saturation is 98%.     Chief Complaint: Cough    Pt present today c/o sore throat and cough. Pt was here 6 days ago with the same symptoms and got tested for strep and covid both were negative. Still congested, sore throat, cough and ears feel clogged, shes been taking otc meds with no relief.     Cough  This is a recurrent problem. The current episode started 1 to 4 weeks ago. The problem has been gradually worsening. The problem occurs constantly. The cough is Productive of sputum. Associated symptoms include ear congestion, headaches, nasal congestion and a sore throat. Nothing aggravates the symptoms. Treatments tried: otc meds. The treatment provided no relief.     HENT:  Positive for sore throat.    Respiratory:  Positive for cough.    Neurological:  Positive for headaches.    Objective:     Physical Exam   Constitutional: She is oriented to person, place, and time. She appears well-developed. She is cooperative.  Non-toxic appearance. She does not appear ill. No distress.   HENT:   Head: Normocephalic and atraumatic.   Ears:   Right Ear: Hearing, external ear and ear canal normal.   Left Ear: Hearing, external ear and ear canal normal.      Comments: Retracted, slightly erythematous tympanic membranes.  Nose: Congestion present. No mucosal edema, rhinorrhea or nasal deformity. No epistaxis. Right sinus exhibits no maxillary sinus tenderness and no frontal sinus tenderness. Left sinus exhibits no maxillary sinus tenderness and no frontal sinus tenderness.   Mouth/Throat: Uvula is midline, oropharynx is clear and moist and mucous membranes are normal. Mucous membranes are moist. No trismus in the jaw. Normal dentition. No uvula swelling. No oropharyngeal exudate, " posterior oropharyngeal edema or posterior oropharyngeal erythema.      Comments: Yellow postnasal drip with posterior pharyngeal erythema.  Tender maxillary sinuses.  Eyes: Conjunctivae and lids are normal. No scleral icterus.   Neck: Trachea normal and phonation normal. Neck supple. No edema present. No erythema present. No neck rigidity present.   Cardiovascular: Normal rate, regular rhythm, normal heart sounds and normal pulses.   Pulmonary/Chest: Effort normal and breath sounds normal. No respiratory distress. She has no decreased breath sounds. She has no wheezes. She has no rhonchi. She has no rales.   Abdominal: Normal appearance.   Musculoskeletal: Normal range of motion.         General: No deformity. Normal range of motion.   Neurological: no focal deficit. She is alert and oriented to person, place, and time. She exhibits normal muscle tone. Coordination normal.   Skin: Skin is warm, dry, intact, not diaphoretic and not pale. Capillary refill takes less than 2 seconds.   Psychiatric: Her speech is normal and behavior is normal. Mood, judgment and thought content normal.   Nursing note and vitals reviewed.    27-year-old with 9-10 day history of sinus congestion postnasal drip exam consistent with bacterial sinusitis.  Will treat with antibiotic and give short course of steroid.    Assessment:     1. Acute non-recurrent sinusitis, unspecified location        Plan:       Acute non-recurrent sinusitis, unspecified location  -     amoxicillin (AMOXIL) 875 MG tablet; Take 1 tablet (875 mg total) by mouth every 12 (twelve) hours. for 5 days  Dispense: 10 tablet; Refill: 0  -     predniSONE (DELTASONE) 20 MG tablet; Take 1 tablet (20 mg total) by mouth 2 (two) times daily for 2 days, THEN 1 tablet (20 mg total) once daily for 3 days.  Dispense: 7 tablet; Refill: 0

## 2023-04-28 ENCOUNTER — OFFICE VISIT (OUTPATIENT)
Dept: URGENT CARE | Facility: CLINIC | Age: 28
End: 2023-04-28
Payer: OTHER GOVERNMENT

## 2023-04-28 VITALS
OXYGEN SATURATION: 99 % | HEART RATE: 71 BPM | TEMPERATURE: 99 F | DIASTOLIC BLOOD PRESSURE: 75 MMHG | BODY MASS INDEX: 21.16 KG/M2 | HEIGHT: 62 IN | SYSTOLIC BLOOD PRESSURE: 119 MMHG | WEIGHT: 115 LBS

## 2023-04-28 DIAGNOSIS — R05.9 COUGH, UNSPECIFIED TYPE: Primary | ICD-10-CM

## 2023-04-28 DIAGNOSIS — J02.9 SORE THROAT: ICD-10-CM

## 2023-04-28 LAB
CTP QC/QA: YES
MOLECULAR STREP A: NEGATIVE

## 2023-04-28 PROCEDURE — 87651 POCT STREP A MOLECULAR: ICD-10-PCS | Mod: QW,S$GLB,, | Performed by: PHYSICIAN ASSISTANT

## 2023-04-28 PROCEDURE — 99213 OFFICE O/P EST LOW 20 MIN: CPT | Mod: S$GLB,,, | Performed by: PHYSICIAN ASSISTANT

## 2023-04-28 PROCEDURE — 99213 PR OFFICE/OUTPT VISIT, EST, LEVL III, 20-29 MIN: ICD-10-PCS | Mod: S$GLB,,, | Performed by: PHYSICIAN ASSISTANT

## 2023-04-28 PROCEDURE — 87651 STREP A DNA AMP PROBE: CPT | Mod: QW,S$GLB,, | Performed by: PHYSICIAN ASSISTANT

## 2023-04-28 RX ORDER — PREDNISONE 10 MG/1
TABLET ORAL
Qty: 11 TABLET | Refills: 0 | Status: SHIPPED | OUTPATIENT
Start: 2023-04-28 | End: 2023-06-02

## 2023-04-28 RX ORDER — PROMETHAZINE HYDROCHLORIDE AND DEXTROMETHORPHAN HYDROBROMIDE 6.25; 15 MG/5ML; MG/5ML
5 SYRUP ORAL NIGHTLY PRN
Qty: 118 ML | Refills: 0 | Status: SHIPPED | OUTPATIENT
Start: 2023-04-28 | End: 2023-05-08

## 2023-04-28 NOTE — PROGRESS NOTES
"Subjective:      Patient ID: Sherice De Santiago is a 27 y.o. female.    Vitals:  height is 5' 2" (1.575 m) and weight is 52.2 kg (115 lb). Her temperature is 98.5 °F (36.9 °C). Her blood pressure is 119/75 and her pulse is 71. Her oxygen saturation is 99%.     Chief Complaint: Nasal Congestion    Cough, sore throat and congestion that started approximately 2-3 weeks ago   Seen here on 04/13- fever, cough, post nasal drip, sore throat and congestion, negative for COVID and Strep, given Flonase and Astelin   Returned on 04/19- cough and sore throat, was given Amoxicillin and Prednisone   Patient states she has been up at night due to the cough      Other  This is a recurrent problem. The current episode started 1 to 4 weeks ago. The problem occurs intermittently. The problem has been unchanged. Associated symptoms include congestion, coughing and a sore throat. Pertinent negatives include no fever. Treatments tried: RX and OTC meds.     Constitution: Negative for fever.   HENT:  Positive for congestion and sore throat.    Respiratory:  Positive for cough.     Objective:     Physical Exam   Constitutional: She does not appear ill. No distress.   HENT:   Head: Normocephalic and atraumatic.   Ears:   Right Ear: Tympanic membrane, external ear and ear canal normal.   Left Ear: Tympanic membrane, external ear and ear canal normal.   Nose: Right sinus exhibits no maxillary sinus tenderness and no frontal sinus tenderness. Left sinus exhibits no maxillary sinus tenderness and no frontal sinus tenderness.   Mouth/Throat: Mucous membranes are moist. Posterior oropharyngeal erythema (mild) present. No oropharyngeal exudate. Oropharynx is clear.   Eyes: Conjunctivae are normal. Right eye exhibits no discharge. Left eye exhibits no discharge. Extraocular movement intact   Neck: Neck supple.   Cardiovascular: Normal rate, regular rhythm and normal heart sounds.   No murmur heard.  Pulmonary/Chest: Effort normal and breath sounds " normal. She has no wheezes. She has no rhonchi. She has no rales.   Abdominal: Normal appearance.   Musculoskeletal: Normal range of motion.         General: Normal range of motion.   Lymphadenopathy:     She has no cervical adenopathy.   Neurological: no focal deficit. She is alert.   Skin: Skin is warm, dry and not pale. jaundice  Psychiatric: Her behavior is normal. Mood, judgment and thought content normal.   Nursing note and vitals reviewed.    Assessment:     1. Cough, unspecified type    2. Sore throat        Plan:       Cough, unspecified type    Sore throat  -     POCT Strep A, Molecular    Results for orders placed or performed in visit on 04/28/23   POCT Strep A, Molecular   Result Value Ref Range    Molecular Strep A, POC Negative Negative     Acceptable Yes         Other orders  -     promethazine-dextromethorphan (PROMETHAZINE-DM) 6.25-15 mg/5 mL Syrp; Take 5 mLs by mouth nightly as needed (cough).  Dispense: 118 mL; Refill: 0  -     predniSONE (DELTASONE) 10 MG tablet; Take 40mg for 1 days, take 30mg for 1 days, take 20mg for 1 days, take 10mg for 2 days  Dispense: 11 tablet; Refill: 0      Patient Instructions   You must understand that you've received an Urgent Care treatment only and that you may be released before all your medical problems are known or treated. You, the patient, will arrange for follow up care as instructed.  Follow up with your PCP or specialty clinic as directed in the next 1-2 weeks if not improved or as needed.  You can call [phone] to schedule an appointment with the appropriate provider.  If your condition worsens we recommend that you receive another evaluation at the emergency room immediately or contact your primary medical clinics after hours call service to discuss your concerns.  Please return here or go to the Emergency Department for any concerns or worsening of condition.

## 2023-04-28 NOTE — PATIENT INSTRUCTIONS

## 2023-05-04 ENCOUNTER — TELEPHONE (OUTPATIENT)
Dept: FAMILY MEDICINE | Facility: CLINIC | Age: 28
End: 2023-05-04
Payer: OTHER GOVERNMENT

## 2023-05-04 NOTE — TELEPHONE ENCOUNTER
----- Message from Aliyah Escobar sent at 5/4/2023  9:50 AM CDT -----  Caller: pt    Requesting Appt With Provider: any    Preferred Date Range: 05/04    Preferred Times: any    Reason for visit: congestion, mucus, and cough    Additional Information: pt also wants to est care with pcp megan is who she prefers to see but will accept first available . Pls advise

## 2023-06-02 ENCOUNTER — OFFICE VISIT (OUTPATIENT)
Dept: FAMILY MEDICINE | Facility: CLINIC | Age: 28
End: 2023-06-02
Payer: MEDICAID

## 2023-06-02 VITALS
DIASTOLIC BLOOD PRESSURE: 68 MMHG | SYSTOLIC BLOOD PRESSURE: 120 MMHG | WEIGHT: 119.19 LBS | BODY MASS INDEX: 21.93 KG/M2 | HEIGHT: 62 IN | HEART RATE: 95 BPM | OXYGEN SATURATION: 99 %

## 2023-06-02 DIAGNOSIS — R87.618 OTHER ABNORMAL CYTOLOGICAL FINDING OF SPECIMEN FROM CERVIX: ICD-10-CM

## 2023-06-02 DIAGNOSIS — Z11.59 ENCOUNTER FOR HEPATITIS C SCREENING TEST FOR LOW RISK PATIENT: ICD-10-CM

## 2023-06-02 DIAGNOSIS — E80.6 BILIRUBINEMIA: Primary | ICD-10-CM

## 2023-06-02 DIAGNOSIS — Z11.4 SCREENING FOR HIV (HUMAN IMMUNODEFICIENCY VIRUS): ICD-10-CM

## 2023-06-02 DIAGNOSIS — R10.2 PELVIC PAIN: ICD-10-CM

## 2023-06-02 DIAGNOSIS — J02.9 SORE THROAT: ICD-10-CM

## 2023-06-02 DIAGNOSIS — E61.1 IRON DEFICIENCY: ICD-10-CM

## 2023-06-02 DIAGNOSIS — J30.1 NON-SEASONAL ALLERGIC RHINITIS DUE TO POLLEN: ICD-10-CM

## 2023-06-02 LAB
CTP QC/QA: YES
CTP QC/QA: YES
MOLECULAR STREP A: NEGATIVE
SARS-COV-2 RDRP RESP QL NAA+PROBE: NEGATIVE

## 2023-06-02 PROCEDURE — 99213 OFFICE O/P EST LOW 20 MIN: CPT | Mod: PBBFAC,PO | Performed by: FAMILY MEDICINE

## 2023-06-02 PROCEDURE — 3078F PR MOST RECENT DIASTOLIC BLOOD PRESSURE < 80 MM HG: ICD-10-PCS | Mod: CPTII,,, | Performed by: FAMILY MEDICINE

## 2023-06-02 PROCEDURE — 1159F PR MEDICATION LIST DOCUMENTED IN MEDICAL RECORD: ICD-10-PCS | Mod: CPTII,,, | Performed by: FAMILY MEDICINE

## 2023-06-02 PROCEDURE — 3074F PR MOST RECENT SYSTOLIC BLOOD PRESSURE < 130 MM HG: ICD-10-PCS | Mod: CPTII,,, | Performed by: FAMILY MEDICINE

## 2023-06-02 PROCEDURE — 3078F DIAST BP <80 MM HG: CPT | Mod: CPTII,,, | Performed by: FAMILY MEDICINE

## 2023-06-02 PROCEDURE — 1159F MED LIST DOCD IN RCRD: CPT | Mod: CPTII,,, | Performed by: FAMILY MEDICINE

## 2023-06-02 PROCEDURE — 87635 SARS-COV-2 COVID-19 AMP PRB: CPT | Mod: PBBFAC,PO | Performed by: FAMILY MEDICINE

## 2023-06-02 PROCEDURE — 99214 PR OFFICE/OUTPT VISIT, EST, LEVL IV, 30-39 MIN: ICD-10-PCS | Mod: S$PBB,,, | Performed by: FAMILY MEDICINE

## 2023-06-02 PROCEDURE — 3074F SYST BP LT 130 MM HG: CPT | Mod: CPTII,,, | Performed by: FAMILY MEDICINE

## 2023-06-02 PROCEDURE — 3008F BODY MASS INDEX DOCD: CPT | Mod: CPTII,,, | Performed by: FAMILY MEDICINE

## 2023-06-02 PROCEDURE — 99999 PR PBB SHADOW E&M-EST. PATIENT-LVL III: ICD-10-PCS | Mod: PBBFAC,,, | Performed by: FAMILY MEDICINE

## 2023-06-02 PROCEDURE — 99999 PR PBB SHADOW E&M-EST. PATIENT-LVL III: CPT | Mod: PBBFAC,,, | Performed by: FAMILY MEDICINE

## 2023-06-02 PROCEDURE — 99214 OFFICE O/P EST MOD 30 MIN: CPT | Mod: S$PBB,,, | Performed by: FAMILY MEDICINE

## 2023-06-02 PROCEDURE — 87651 STREP A DNA AMP PROBE: CPT | Mod: PBBFAC,PO | Performed by: FAMILY MEDICINE

## 2023-06-02 PROCEDURE — 3008F PR BODY MASS INDEX (BMI) DOCUMENTED: ICD-10-PCS | Mod: CPTII,,, | Performed by: FAMILY MEDICINE

## 2023-06-02 RX ORDER — CETIRIZINE HYDROCHLORIDE 10 MG/1
10 TABLET ORAL NIGHTLY PRN
Qty: 90 TABLET | Refills: 3 | Status: SHIPPED | OUTPATIENT
Start: 2023-06-02 | End: 2024-06-01

## 2023-06-02 RX ORDER — ACETAMINOPHEN 500 MG
1 TABLET ORAL DAILY
COMMUNITY

## 2023-06-02 RX ORDER — ZINC GLUCONATE 50 MG
50 TABLET ORAL DAILY
COMMUNITY

## 2023-06-02 RX ORDER — IBUPROFEN 100 MG/5ML
1000 SUSPENSION, ORAL (FINAL DOSE FORM) ORAL DAILY
COMMUNITY

## 2023-06-04 NOTE — PROGRESS NOTES
Assessment:       1. Bilirubinemia    2. Pelvic pain    3. Iron deficiency    4. Encounter for hepatitis C screening test for low risk patient    5. Screening for HIV (human immunodeficiency virus)    6. Abnormal Pap smear   7. Sore throat    8. Non-seasonal allergic rhinitis due to pollen        Plan:       Bilirubinemia:  New problem workup needed  -     Comprehensive Metabolic Panel; Future; Expected date: 06/02/2023    Pelvic pain:  New problem workup needed  -     CBC Auto Differential; Future; Expected date: 06/02/2023  -     Comprehensive Metabolic Panel; Future; Expected date: 06/02/2023    Iron deficiency:  New problem workup needed  -     CBC Auto Differential; Future; Expected date: 06/02/2023  -     Iron and TIBC; Future; Expected date: 06/02/2023  -     Ferritin; Future; Expected date: 06/02/2023    Encounter for hepatitis C screening test for low risk patient  -     Hepatitis C Antibody; Future; Expected date: 06/02/2023    Screening for HIV (human immunodeficiency virus)  -     HIV 1/2 Ag/Ab (4th Gen); Future; Expected date: 06/02/2023    Abnormal Pap smear:  New problem workup needed  -     Ambulatory referral/consult to Gynecology; Future; Expected date: 06/09/2023    Sore throat:  New problem workup needed  -     POCT COVID-19 Rapid Screening; Future; Expected date: 06/02/2023  -     POCT Strep A, Molecular; Future; Expected date: 06/02/2023    Non-seasonal allergic rhinitis due to pollen:  Stable  -     cetirizine (ZYRTEC) 10 MG tablet; Take 1 tablet (10 mg total) by mouth nightly as needed for Allergies.  Dispense: 90 tablet; Refill: 3         Recommend Zyrtec at bedtime, start taking vitamin-D 2000 units every day over-the-counter, zinc 75 mg every day over-the-counter, vitamin-C 1000 mg every day over-the-counter, continue drinking plenty water, will check blood work, will send a message when we have the results of the test.  COVID-19 strep both were negative.  Will refer the patient to the  gynecologist secondary to the history of abnormal Pap smear.  The patient's BMI has been recorded in the chart. The patient has been provided educational materials regarding the benefits of attaining and maintaining a normal weight. We will continue to address and follow this issue during follow up visits.   Patient agreed with assessment and plan. Patient verbalized understanding.     Subjective:       Patient ID: Sherice De Santiago is a 27 y.o. female.    Chief Complaint: Establish Care    HPI    The patient is here today to establish a new primary care physician.    Pelvic pain:  The patient is complaining of pelvic pain, the symptoms are the same, the patient had a history of abnormal Pap smear and she would like a referral to see a gynecologist.    Sore throat:  The patient stated that she just woke up with sore throat this morning, she has been having issues with infections very often, the patient would like to make sure that everything is okay, per review of the last blood work, the patient has bilirubinemia.    Iron deficiency:  The blood work showed presence of iron deficiency, the patient was told for a follow-up on these in 3-6 months.    Past medical history, past social history was reviewed and discussed with the patient.    Review of Systems   Constitutional:  Negative for activity change and appetite change.   HENT:  Positive for congestion, postnasal drip, rhinorrhea and sore throat. Negative for ear discharge.    Eyes:  Negative for discharge and itching.   Respiratory:  Negative for choking and chest tightness.    Cardiovascular:  Negative for chest pain and palpitations.   Gastrointestinal:  Negative for abdominal distention and abdominal pain.   Endocrine: Negative for cold intolerance and heat intolerance.   Genitourinary:  Negative for dysuria and flank pain.   Musculoskeletal:  Negative for arthralgias and back pain.   Skin:  Negative for pallor and rash.   Allergic/Immunologic: Negative for  environmental allergies and food allergies.   Neurological:  Negative for dizziness, facial asymmetry and headaches.   Hematological:  Negative for adenopathy. Does not bruise/bleed easily.   Psychiatric/Behavioral:  Negative for agitation and confusion.      Objective:      Physical Exam  Vitals and nursing note reviewed.   Constitutional:       General: She is not in acute distress.     Appearance: Normal appearance. She is well-developed and normal weight. She is not diaphoretic.   HENT:      Head: Normocephalic and atraumatic.      Right Ear: External ear normal.      Left Ear: External ear normal.      Nose: Nose normal.      Mouth/Throat:      Pharynx: Posterior oropharyngeal erythema present.   Eyes:      General: No scleral icterus.        Right eye: No discharge.         Left eye: No discharge.      Conjunctiva/sclera: Conjunctivae normal.   Cardiovascular:      Rate and Rhythm: Normal rate and regular rhythm.      Heart sounds: Normal heart sounds.   Pulmonary:      Effort: Pulmonary effort is normal. No respiratory distress.      Breath sounds: Normal breath sounds. No wheezing.   Musculoskeletal:         General: No tenderness or deformity.      Cervical back: Neck supple.   Skin:     Coloration: Skin is not pale.      Findings: No erythema.   Neurological:      Mental Status: She is alert.   Psychiatric:         Behavior: Behavior normal.         Thought Content: Thought content normal.         Judgment: Judgment normal.

## 2023-06-05 ENCOUNTER — LAB VISIT (OUTPATIENT)
Dept: LAB | Facility: HOSPITAL | Age: 28
End: 2023-06-05
Attending: FAMILY MEDICINE
Payer: MEDICAID

## 2023-06-05 DIAGNOSIS — E61.1 IRON DEFICIENCY: ICD-10-CM

## 2023-06-05 DIAGNOSIS — Z11.59 ENCOUNTER FOR HEPATITIS C SCREENING TEST FOR LOW RISK PATIENT: ICD-10-CM

## 2023-06-05 DIAGNOSIS — Z11.4 SCREENING FOR HIV (HUMAN IMMUNODEFICIENCY VIRUS): ICD-10-CM

## 2023-06-05 DIAGNOSIS — R10.2 PELVIC PAIN: ICD-10-CM

## 2023-06-05 DIAGNOSIS — E80.6 BILIRUBINEMIA: ICD-10-CM

## 2023-06-05 LAB
ALBUMIN SERPL BCP-MCNC: 4 G/DL (ref 3.5–5.2)
ALP SERPL-CCNC: 63 U/L (ref 55–135)
ALT SERPL W/O P-5'-P-CCNC: 14 U/L (ref 10–44)
ANION GAP SERPL CALC-SCNC: 4 MMOL/L (ref 8–16)
AST SERPL-CCNC: 20 U/L (ref 10–40)
BASOPHILS # BLD AUTO: 0.01 K/UL (ref 0–0.2)
BASOPHILS NFR BLD: 0.2 % (ref 0–1.9)
BILIRUB SERPL-MCNC: 0.6 MG/DL (ref 0.1–1)
BUN SERPL-MCNC: 18 MG/DL (ref 6–20)
CALCIUM SERPL-MCNC: 9.4 MG/DL (ref 8.7–10.5)
CHLORIDE SERPL-SCNC: 107 MMOL/L (ref 95–110)
CO2 SERPL-SCNC: 27 MMOL/L (ref 23–29)
CREAT SERPL-MCNC: 0.8 MG/DL (ref 0.5–1.4)
DIFFERENTIAL METHOD: NORMAL
EOSINOPHIL # BLD AUTO: 0 K/UL (ref 0–0.5)
EOSINOPHIL NFR BLD: 0.2 % (ref 0–8)
ERYTHROCYTE [DISTWIDTH] IN BLOOD BY AUTOMATED COUNT: 12.4 % (ref 11.5–14.5)
EST. GFR  (NO RACE VARIABLE): >60 ML/MIN/1.73 M^2
FERRITIN SERPL-MCNC: 28 NG/ML (ref 20–300)
GLUCOSE SERPL-MCNC: 97 MG/DL (ref 70–110)
HCT VFR BLD AUTO: 39.5 % (ref 37–48.5)
HCV AB SERPL QL IA: NORMAL
HGB BLD-MCNC: 12.8 G/DL (ref 12–16)
HIV 1+2 AB+HIV1 P24 AG SERPL QL IA: NORMAL
IMM GRANULOCYTES # BLD AUTO: 0.01 K/UL (ref 0–0.04)
IMM GRANULOCYTES NFR BLD AUTO: 0.2 % (ref 0–0.5)
IRON SERPL-MCNC: 78 UG/DL (ref 30–160)
LYMPHOCYTES # BLD AUTO: 1.3 K/UL (ref 1–4.8)
LYMPHOCYTES NFR BLD: 28.5 % (ref 18–48)
MCH RBC QN AUTO: 30.6 PG (ref 27–31)
MCHC RBC AUTO-ENTMCNC: 32.4 G/DL (ref 32–36)
MCV RBC AUTO: 95 FL (ref 82–98)
MONOCYTES # BLD AUTO: 0.4 K/UL (ref 0.3–1)
MONOCYTES NFR BLD: 9.5 % (ref 4–15)
NEUTROPHILS # BLD AUTO: 2.8 K/UL (ref 1.8–7.7)
NEUTROPHILS NFR BLD: 61.4 % (ref 38–73)
NRBC BLD-RTO: 0 /100 WBC
PLATELET # BLD AUTO: 186 K/UL (ref 150–450)
PMV BLD AUTO: 12.1 FL (ref 9.2–12.9)
POTASSIUM SERPL-SCNC: 4.4 MMOL/L (ref 3.5–5.1)
PROT SERPL-MCNC: 7.1 G/DL (ref 6–8.4)
RBC # BLD AUTO: 4.18 M/UL (ref 4–5.4)
SATURATED IRON: 20 % (ref 20–50)
SODIUM SERPL-SCNC: 138 MMOL/L (ref 136–145)
TOTAL IRON BINDING CAPACITY: 400 UG/DL (ref 250–450)
TRANSFERRIN SERPL-MCNC: 270 MG/DL (ref 200–375)
WBC # BLD AUTO: 4.53 K/UL (ref 3.9–12.7)

## 2023-06-05 PROCEDURE — 82728 ASSAY OF FERRITIN: CPT | Performed by: FAMILY MEDICINE

## 2023-06-05 PROCEDURE — 87389 HIV-1 AG W/HIV-1&-2 AB AG IA: CPT | Performed by: FAMILY MEDICINE

## 2023-06-05 PROCEDURE — 84466 ASSAY OF TRANSFERRIN: CPT | Performed by: FAMILY MEDICINE

## 2023-06-05 PROCEDURE — 85025 COMPLETE CBC W/AUTO DIFF WBC: CPT | Performed by: FAMILY MEDICINE

## 2023-06-05 PROCEDURE — 86803 HEPATITIS C AB TEST: CPT | Performed by: FAMILY MEDICINE

## 2023-06-05 PROCEDURE — 80053 COMPREHEN METABOLIC PANEL: CPT | Performed by: FAMILY MEDICINE

## 2023-06-05 PROCEDURE — 36415 COLL VENOUS BLD VENIPUNCTURE: CPT | Mod: PO | Performed by: FAMILY MEDICINE

## 2023-06-09 ENCOUNTER — OFFICE VISIT (OUTPATIENT)
Dept: URGENT CARE | Facility: CLINIC | Age: 28
End: 2023-06-09
Payer: MEDICAID

## 2023-06-09 VITALS
WEIGHT: 120 LBS | TEMPERATURE: 98 F | BODY MASS INDEX: 22.08 KG/M2 | HEIGHT: 62 IN | DIASTOLIC BLOOD PRESSURE: 72 MMHG | SYSTOLIC BLOOD PRESSURE: 107 MMHG | RESPIRATION RATE: 18 BRPM | OXYGEN SATURATION: 97 % | HEART RATE: 93 BPM

## 2023-06-09 DIAGNOSIS — B30.9 ACUTE VIRAL CONJUNCTIVITIS, UNSPECIFIED LATERALITY: Primary | ICD-10-CM

## 2023-06-09 PROCEDURE — 99213 PR OFFICE/OUTPT VISIT, EST, LEVL III, 20-29 MIN: ICD-10-PCS | Mod: S$GLB,,, | Performed by: PHYSICIAN ASSISTANT

## 2023-06-09 PROCEDURE — 99213 OFFICE O/P EST LOW 20 MIN: CPT | Mod: S$GLB,,, | Performed by: PHYSICIAN ASSISTANT

## 2023-06-09 RX ORDER — OLOPATADINE HYDROCHLORIDE 1 MG/ML
1 SOLUTION/ DROPS OPHTHALMIC 2 TIMES DAILY
Qty: 5 ML | Refills: 0 | Status: SHIPPED | OUTPATIENT
Start: 2023-06-09 | End: 2024-02-01

## 2023-06-09 RX ORDER — PREDNISONE 20 MG/1
20 TABLET ORAL DAILY
Qty: 2 TABLET | Refills: 0 | Status: SHIPPED | OUTPATIENT
Start: 2023-06-09 | End: 2023-06-11

## 2023-06-09 NOTE — PROGRESS NOTES
"Subjective:      Patient ID: Sherice De Santiago is a 27 y.o. female.    Vitals:  height is 5' 2" (1.575 m) and weight is 54.4 kg (120 lb). Her temperature is 98.4 °F (36.9 °C). Her blood pressure is 107/72 and her pulse is 93. Her respiration is 18 and oxygen saturation is 97%.     Chief Complaint: Conjunctivitis    Patient is a 27 year old presents today with c/o possible conjunctivitis in both eyes x's 4days. Pt has used polymyxin B sulfate and Trimethoprim Ophthalmic solution for symptoms with no relief. Pt has c/o congestion and hoarseness for a week. Pt states that her son had pink eye about 2 weeks ago.  Pain level 7/10      Conjunctivitis  This is a new problem. The current episode started in the past 7 days. The problem occurs constantly. The problem has been gradually worsening. Associated symptoms include congestion, fatigue and a visual change. Pertinent negatives include no abdominal pain, chest pain, chills, coughing, fever, headaches, myalgias, nausea, sore throat or vomiting. The treatment provided no relief.     Constitution: Positive for fatigue. Negative for chills and fever.   HENT:  Positive for congestion. Negative for ear pain, postnasal drip, sinus pain, sinus pressure, sore throat and trouble swallowing.    Neck: Negative for painful lymph nodes.   Cardiovascular:  Negative for chest pain.   Eyes:  Positive for eye discharge, eye pain, eye redness and eyelid swelling. Negative for eye trauma, foreign body in eye, eye itching, photophobia, vision loss, double vision and blurred vision.   Respiratory:  Negative for cough, sputum production, shortness of breath and wheezing.    Gastrointestinal:  Negative for abdominal pain, nausea, vomiting and diarrhea.   Musculoskeletal:  Negative for muscle ache.   Neurological:  Negative for headaches.   Hematologic/Lymphatic: Negative for swollen lymph nodes.    Objective:     Physical Exam   Constitutional: She is oriented to person, place, and time. She " appears well-developed.   HENT:   Head: Normocephalic and atraumatic.   Ears:   Right Ear: External ear normal.   Left Ear: External ear normal.   Nose: Nose normal.   Mouth/Throat: Oropharynx is clear and moist.   Eyes: EOM and lids are normal. Pupils are equal, round, and reactive to light. No visual field deficit is present. Right eye exhibits discharge. Right eye exhibits no chemosis, no exudate and no hordeolum. No foreign body present in the right eye. Left eye exhibits discharge. Left eye exhibits no chemosis, no exudate and no hordeolum. No foreign body present in the left eye. Right conjunctiva is injected. Right conjunctiva has no hemorrhage. Left conjunctiva is injected. Left conjunctiva has no hemorrhage. Right eye exhibits normal extraocular motion. Left eye exhibits normal extraocular motion. Extraocular movement intact vision grossly intact gaze aligned appropriately periorbital hyperpigmentation  Neck: Trachea normal and phonation normal. Neck supple.   Musculoskeletal: Normal range of motion.         General: Normal range of motion.   Neurological: She is alert and oriented to person, place, and time.   Skin: Skin is warm, dry and intact.   Psychiatric: Her speech is normal and behavior is normal. Judgment and thought content normal.   Nursing note and vitals reviewed.    Assessment:     1. Acute viral conjunctivitis, unspecified laterality        Plan:       Acute viral conjunctivitis, unspecified laterality  -     olopatadine (PATANOL) 0.1 % ophthalmic solution; Place 1 drop into both eyes 2 (two) times daily.  Dispense: 5 mL; Refill: 0  -     predniSONE (DELTASONE) 20 MG tablet; Take 1 tablet (20 mg total) by mouth once daily. for 2 days  Dispense: 2 tablet; Refill: 0      Discussed viral conjunctivitis and use of patanol and prednisone as treatment. Discussed continued use of oral antihistamines. RTC precautions given. Patient verbalized understanding and agrees with plan.     Lidia Galan,  [facility]    Patient Instructions                                      Conjunctivitis  If your condition worsens or fails to improve we recommend that you receive another evaluation at the ER immediately or contact your PCP to discuss your concerns or return here. You must understand that you've received an urgent care treatment only and that you may be released before all your medical problems are known or treated. You the patient will arrange for followup care as instructed.   Keep eyes cleaned.  Use the eye drops as prescribed.    Do not wear your contact lens ( if you use them) for at least 5 days after you stop having symptoms and are rechecked by your doctor.   Avoid sharing towels while infection persist.  Cool compresses to affected eye.   Throw away the contacts, contact solution and carrying case you were using and start with new material.   If you develop increase eye symptoms or change in your vision seek medical care immediately either with your ophthalomologist or the ER or return here.

## 2023-06-09 NOTE — PATIENT INSTRUCTIONS
Conjunctivitis  If your condition worsens or fails to improve we recommend that you receive another evaluation at the ER immediately or contact your PCP to discuss your concerns or return here. You must understand that you've received an urgent care treatment only and that you may be released before all your medical problems are known or treated. You the patient will arrange for followup care as instructed.   Keep eyes cleaned.  Use the eye drops as prescribed.    Do not wear your contact lens ( if you use them) for at least 5 days after you stop having symptoms and are rechecked by your doctor.   Avoid sharing towels while infection persist.  Cool compresses to affected eye.   Throw away the contacts, contact solution and carrying case you were using and start with new material.   If you develop increase eye symptoms or change in your vision seek medical care immediately either with your ophthalomologist or the ER or return here.

## 2023-06-30 ENCOUNTER — TELEPHONE (OUTPATIENT)
Dept: FAMILY MEDICINE | Facility: CLINIC | Age: 28
End: 2023-06-30
Payer: MEDICAID

## 2023-06-30 NOTE — TELEPHONE ENCOUNTER
Spoke with Pam RIVERA about the PA and she stated that she would contact pt to discuss her concern.

## 2023-06-30 NOTE — TELEPHONE ENCOUNTER
----- Message from Rafaela Hernandez sent at 6/30/2023  9:56 AM CDT -----  Contact: Self  Type:  Pharmacy Calling to Clarify an RX    Name of Caller:  Patient  Pharmacy Name:  [facility]  Prescription Name:  desvenlafaxine succinate (PRISTIQ) 100 MG Tb24  What do they need to clarify?:  Pt stated pharmacist told her they Need a Prior Auth for her meds  Best Call Back Number:  [phone]  Additional Information:  Please call pt back to advise. Pt stated she only has 2 left and needs this done ASAP. Thank You

## 2023-07-03 ENCOUNTER — DOCUMENTATION ONLY (OUTPATIENT)
Dept: FAMILY MEDICINE | Facility: CLINIC | Age: 28
End: 2023-07-03
Payer: MEDICAID

## 2023-09-11 ENCOUNTER — OFFICE VISIT (OUTPATIENT)
Dept: URGENT CARE | Facility: CLINIC | Age: 28
End: 2023-09-11
Payer: MEDICAID

## 2023-09-11 VITALS
SYSTOLIC BLOOD PRESSURE: 114 MMHG | TEMPERATURE: 98 F | DIASTOLIC BLOOD PRESSURE: 73 MMHG | RESPIRATION RATE: 16 BRPM | OXYGEN SATURATION: 99 % | HEART RATE: 63 BPM

## 2023-09-11 DIAGNOSIS — R05.9 COUGH, UNSPECIFIED TYPE: Primary | ICD-10-CM

## 2023-09-11 DIAGNOSIS — R09.81 SINUS CONGESTION: ICD-10-CM

## 2023-09-11 PROCEDURE — 99213 OFFICE O/P EST LOW 20 MIN: CPT | Mod: S$GLB,,, | Performed by: FAMILY MEDICINE

## 2023-09-11 PROCEDURE — 99213 PR OFFICE/OUTPT VISIT, EST, LEVL III, 20-29 MIN: ICD-10-PCS | Mod: S$GLB,,, | Performed by: FAMILY MEDICINE

## 2023-09-11 RX ORDER — DEXAMETHASONE SODIUM PHOSPHATE 100 MG/10ML
10 INJECTION INTRAMUSCULAR; INTRAVENOUS
Status: COMPLETED | OUTPATIENT
Start: 2023-09-11 | End: 2023-09-11

## 2023-09-11 RX ADMIN — DEXAMETHASONE SODIUM PHOSPHATE 10 MG: 100 INJECTION INTRAMUSCULAR; INTRAVENOUS at 04:09

## 2023-09-11 NOTE — PROGRESS NOTES
Subjective:      Patient ID: Sherice De Santiago is a 27 y.o. female.    Vitals:  temperature is 97.9 °F (36.6 °C). Her blood pressure is 114/73 and her pulse is 63. Her respiration is 16 and oxygen saturation is 99%.     Chief Complaint: Generalized Body Aches    Pt had with bodyaches, fever, nausea/vomiting 3 weeks ago.  Symptoms have subsided  but now with prod cough, sinus/chest justice, ears stopped up x  2 weeks.  Neg home covid test 2 weeks ago    Other  This is a new problem. The current episode started 1 to 4 weeks ago. The problem occurs constantly. The problem has been unchanged. Associated symptoms include congestion, coughing and headaches. Pertinent negatives include no fever, myalgias, nausea or vomiting. Nothing aggravates the symptoms. She has tried acetaminophen (mucinex dm, ibuprofen) for the symptoms. The treatment provided mild relief.       Constitution: Negative for fever.   HENT:  Positive for congestion.    Respiratory:  Positive for cough.    Gastrointestinal:  Negative for nausea and vomiting.   Musculoskeletal:  Negative for muscle ache.   Neurological:  Positive for headaches.      Objective:     Physical Exam    Physical Exam  Vitals signs and nursing note reviewed.   Constitutional:       Appearance: Pt is well-developed. Alert, NAD.  Pt is cooperative.  Non-toxic appearance.  HENT:      Head: Normocephalic and atraumatic. .      Right Ear: External ear normal.      Left Ear: External ear normal.   Eyes:      General: Lids are normal.      Conjunctiva/sclera: Conjunctivae normal. Visual tracking is normal. Right eye exhibits no exudate. Left eye exhibits no exudate. No scleral icterus.     Pupils: Pupils are equal, round  Neck:      Musculoskeletal: range of motion without pain and neck supple.      Trachea: Trachea and phonation normal.   Throat: No apparent pharyngeal edema or swelling  Cardiovascular:      Rate and Rhythm: Normal Rhythm. Extremities well perfused.   Pulmonary:       Effort: Pulmonary effort is normal. No respiratory distress. NO stridor or difficulty breathing     Breath sounds: Normal breath sounds.   Abdomen: NO obvious distention.  Musculoskeletal: Normal range of motion. No ambulation issues  Skin:     General: Skin is warm and dry. No open wounds or abrasions. No petechiae No cyanosis  no jaundice not diaphoretic, not pale, not purpuric  Neurological:      Mental Status:Pt is alert and oriented to person, place, and time.   Psychiatric:         Speech: Speech normal.         Behavior: Behavior normal.         Thought Content: Thought content normal.         Judgment: Judgment normal.       Assessment:     1. Cough, unspecified type    2. Sinus congestion        Plan:       Cough, unspecified type    Sinus congestion    Other orders  -     dexAMETHasone injection 10 mg

## 2024-01-17 RX ORDER — DESVENLAFAXINE 100 MG/1
100 TABLET, EXTENDED RELEASE ORAL
Qty: 90 TABLET | Refills: 1 | Status: SHIPPED | OUTPATIENT
Start: 2024-01-17

## 2024-01-23 ENCOUNTER — OFFICE VISIT (OUTPATIENT)
Dept: URGENT CARE | Facility: CLINIC | Age: 29
End: 2024-01-23
Payer: MEDICAID

## 2024-01-23 VITALS
HEART RATE: 83 BPM | SYSTOLIC BLOOD PRESSURE: 106 MMHG | TEMPERATURE: 99 F | DIASTOLIC BLOOD PRESSURE: 62 MMHG | RESPIRATION RATE: 16 BRPM | OXYGEN SATURATION: 99 %

## 2024-01-23 DIAGNOSIS — R09.81 SINUS CONGESTION: Primary | ICD-10-CM

## 2024-01-23 DIAGNOSIS — R05.9 COUGH, UNSPECIFIED TYPE: ICD-10-CM

## 2024-01-23 LAB
CTP QC/QA: YES
CTP QC/QA: YES
POC MOLECULAR INFLUENZA A AGN: NEGATIVE
POC MOLECULAR INFLUENZA B AGN: NEGATIVE
SARS-COV-2 AG RESP QL IA.RAPID: NEGATIVE

## 2024-01-23 PROCEDURE — 87811 SARS-COV-2 COVID19 W/OPTIC: CPT | Mod: QW,S$GLB,, | Performed by: PHYSICIAN ASSISTANT

## 2024-01-23 PROCEDURE — 99213 OFFICE O/P EST LOW 20 MIN: CPT | Mod: S$GLB,,, | Performed by: PHYSICIAN ASSISTANT

## 2024-01-23 PROCEDURE — 87502 INFLUENZA DNA AMP PROBE: CPT | Mod: QW,S$GLB,, | Performed by: PHYSICIAN ASSISTANT

## 2024-01-23 RX ORDER — PROMETHAZINE HYDROCHLORIDE AND DEXTROMETHORPHAN HYDROBROMIDE 6.25; 15 MG/5ML; MG/5ML
5 SYRUP ORAL EVERY 6 HOURS PRN
Qty: 118 ML | Refills: 0 | Status: SHIPPED | OUTPATIENT
Start: 2024-01-23

## 2024-01-23 RX ORDER — PREDNISONE 20 MG/1
TABLET ORAL
Qty: 10 TABLET | Refills: 0 | Status: SHIPPED | OUTPATIENT
Start: 2024-01-23

## 2024-01-23 NOTE — PROGRESS NOTES
Subjective:      Patient ID: Sherice De Santiago is a 28 y.o. female.    Vitals:  temperature is 99 °F (37.2 °C). Her blood pressure is 106/62 and her pulse is 83. Her respiration is 16 and oxygen saturation is 99%.     Chief Complaint: Sinus Problem    Sinus Problem  This is a new problem. The current episode started 1 to 4 weeks ago. The problem has been gradually worsening since onset. There has been no fever. Her pain is at a severity of 4/10. The pain is moderate. Associated symptoms include chills, congestion, coughing, headaches and sinus pressure. Pertinent negatives include no diaphoresis, ear pain, hoarse voice, neck pain, shortness of breath, sneezing, sore throat or swollen glands. Treatments tried: dayquil. The treatment provided mild relief.       Constitution: Positive for chills. Negative for sweating, fatigue and fever.   HENT:  Positive for congestion, postnasal drip, sinus pain and sinus pressure. Negative for ear pain, drooling, nosebleeds, foreign body in nose, sore throat, trouble swallowing and voice change.    Neck: Negative for neck pain, neck stiffness, painful lymph nodes and neck swelling.   Cardiovascular:  Negative for chest pain, leg swelling, palpitations, sob on exertion and passing out.   Eyes:  Negative for eye pain, eye redness, photophobia, double vision, blurred vision and eyelid swelling.   Respiratory:  Positive for cough and sputum production. Negative for chest tightness, bloody sputum, shortness of breath, stridor and wheezing.    Gastrointestinal:  Negative for abdominal pain, abdominal bloating, nausea, vomiting, constipation, diarrhea and heartburn.   Musculoskeletal:  Negative for joint pain, joint swelling, abnormal ROM of joint, back pain, muscle cramps and muscle ache.   Skin:  Negative for rash and hives.   Allergic/Immunologic: Negative for seasonal allergies, food allergies, hives, itching and sneezing.   Neurological:  Positive for headaches. Negative for  dizziness, light-headedness, passing out, loss of balance, altered mental status, loss of consciousness and seizures.   Hematologic/Lymphatic: Negative for swollen lymph nodes.   Psychiatric/Behavioral:  Negative for altered mental status and sleep disturbance.       Objective:     Physical Exam   Constitutional: She is oriented to person, place, and time. She appears well-developed. She is cooperative.  Non-toxic appearance. She does not appear ill. No distress.   HENT:   Head: Normocephalic and atraumatic.   Ears:   Right Ear: Hearing, tympanic membrane, external ear and ear canal normal.   Left Ear: Hearing, tympanic membrane, external ear and ear canal normal.   Nose: Mucosal edema and rhinorrhea present. No nasal deformity. No epistaxis. Right sinus exhibits no maxillary sinus tenderness and no frontal sinus tenderness. Left sinus exhibits no maxillary sinus tenderness and no frontal sinus tenderness.   Mouth/Throat: Uvula is midline and mucous membranes are normal. No trismus in the jaw. Normal dentition. No uvula swelling. Posterior oropharyngeal erythema and cobblestoning present. No oropharyngeal exudate, posterior oropharyngeal edema or tonsillar abscesses. No tonsillar exudate.   Eyes: Conjunctivae and lids are normal. No scleral icterus.   Neck: Trachea normal and phonation normal. Neck supple. No edema present. No erythema present. No neck rigidity present.   Cardiovascular: Normal rate, regular rhythm, normal heart sounds and normal pulses.   Pulmonary/Chest: Effort normal and breath sounds normal. No accessory muscle usage or stridor. No respiratory distress. She has no decreased breath sounds. She has no wheezes. She has no rhonchi. She has no rales.   Abdominal: Normal appearance.   Musculoskeletal: Normal range of motion.         General: No deformity or edema. Normal range of motion.   Lymphadenopathy:     She has no cervical adenopathy.   Neurological: She is alert and oriented to person, place,  and time. She exhibits normal muscle tone. Coordination normal.   Skin: Skin is warm, dry, intact, not diaphoretic, not pale and no rash. Capillary refill takes less than 2 seconds.   Psychiatric: Her speech is normal and behavior is normal. Judgment and thought content normal.   Nursing note and vitals reviewed.      Results for orders placed or performed in visit on 01/23/24   SARS Coronavirus 2 Antigen, POCT Manual Read   Result Value Ref Range    SARS Coronavirus 2 Antigen Negative Negative     Acceptable Yes    POCT Influenza A/B MOLECULAR   Result Value Ref Range    POC Molecular Influenza A Ag Negative Negative, Not Reported    POC Molecular Influenza B Ag Negative Negative, Not Reported     Acceptable Yes      Assessment:     1. Sinus congestion    2. Cough, unspecified type        Plan:   Discussed test results done in clinic today with patient. Advised close follow-up with PCP and/or Specialist for further evaluation as needed. ER precautions given to patient as well. Patient aware, verbalized understanding and agreed with plan of care.    Sinus congestion  -     SARS Coronavirus 2 Antigen, POCT Manual Read  -     POCT Influenza A/B MOLECULAR    Cough, unspecified type  -     SARS Coronavirus 2 Antigen, POCT Manual Read  -     promethazine-dextromethorphan (PROMETHAZINE-DM) 6.25-15 mg/5 mL Syrp; Take 5 mLs by mouth every 6 (six) hours as needed (for cough). Will cause drowsiness.  Dispense: 118 mL; Refill: 0    Other orders  -     predniSONE (DELTASONE) 20 MG tablet; Take 40mg (2 tablets) x 2 days, 30mg (1.5 tablets) x 2 days, 20mg (1 tablet) x 2 days, 10mg (0.5 tablet) x 2 days.  Dispense: 10 tablet; Refill: 0      There are no Patient Instructions on file for this visit.

## 2024-01-23 NOTE — LETTER
January 23, 2024      [facility] [place] [address], [address]  [facility] [address]  Phone: [phone]  Fax: [phone]       Patient: Sherice De Santiago   YOB: 1995  Date of Visit: 01/23/2024    To Whom It May Concern:    Donald De Santiago  was at [facility] on 01/23/2024. Please excuse patient for days missed from school. The patient may return to school on 01/24/2024. If you have any questions or concerns, or if I can be of further assistance, please do not hesitate to contact me.    Sincerely,      Ekta Sutherland PA-C

## 2024-02-01 ENCOUNTER — OFFICE VISIT (OUTPATIENT)
Dept: URGENT CARE | Facility: CLINIC | Age: 29
End: 2024-02-01
Payer: MEDICAID

## 2024-02-01 VITALS
TEMPERATURE: 100 F | SYSTOLIC BLOOD PRESSURE: 114 MMHG | HEART RATE: 78 BPM | OXYGEN SATURATION: 96 % | RESPIRATION RATE: 16 BRPM | WEIGHT: 120 LBS | DIASTOLIC BLOOD PRESSURE: 70 MMHG | HEIGHT: 62 IN | BODY MASS INDEX: 22.08 KG/M2

## 2024-02-01 DIAGNOSIS — H65.01 NON-RECURRENT ACUTE SEROUS OTITIS MEDIA OF RIGHT EAR: Primary | ICD-10-CM

## 2024-02-01 DIAGNOSIS — H65.192 ACUTE EFFUSION OF LEFT EAR: ICD-10-CM

## 2024-02-01 PROCEDURE — 99213 OFFICE O/P EST LOW 20 MIN: CPT | Mod: S$GLB,,, | Performed by: PHYSICIAN ASSISTANT

## 2024-02-01 RX ORDER — AMOXICILLIN 500 MG/1
500 TABLET, FILM COATED ORAL EVERY 12 HOURS
Qty: 20 TABLET | Refills: 0 | Status: SHIPPED | OUTPATIENT
Start: 2024-02-01 | End: 2024-02-11

## 2024-02-01 NOTE — PATIENT INSTRUCTIONS
Take the antibiotics as prescribed with food. Avoid using qtips. Use tylenol/ibuprofen as needed for pain relief/fevers.  Use Flonase for nasal congestion If your symptoms should worsen please return to the urgent care or ED as needed.

## 2024-02-01 NOTE — PROGRESS NOTES
"Subjective:      Patient ID: Sherice De Santiago is a 28 y.o. female.    Vitals:  height is 5' 2" (1.575 m) and weight is 54.4 kg (120 lb). Her temperature is 99.5 °F (37.5 °C). Her blood pressure is 114/70 and her pulse is 78. Her respiration is 16 and oxygen saturation is 96%.     Chief Complaint: Otalgia    Pt presents today with ear pain in both ears  has been sick for a few weeks cannot get rid of congestion has taken otc w no relief. 7/10 pain score    Otalgia   There is pain in both ears. This is a new problem. The current episode started in the past 7 days. The problem occurs constantly. The problem has been unchanged. There has been no fever. The pain is at a severity of 7/10. The pain is mild. Associated symptoms include ear discharge and headaches. Pertinent negatives include no abdominal pain, coughing, diarrhea, hearing loss, neck pain, rash, sore throat or vomiting. She has tried NSAIDs and acetaminophen for the symptoms. The treatment provided no relief.       Constitution: Negative for chills, sweating, fatigue and fever.   HENT:  Positive for ear pain, ear discharge, congestion and postnasal drip. Negative for tinnitus, hearing loss, dental problem, drooling, mouth sores, tongue pain, tongue lesion, sinus pain, sinus pressure, sore throat, trouble swallowing and voice change.    Neck: Negative for neck pain, neck stiffness and painful lymph nodes.   Cardiovascular:  Negative for chest pain and sob on exertion.   Eyes:  Negative for eye discharge and eye itching.   Respiratory:  Negative for chest tightness, cough and shortness of breath.    Gastrointestinal:  Negative for abdominal pain, nausea, vomiting, constipation and diarrhea.   Musculoskeletal:  Negative for muscle cramps and muscle ache.   Skin:  Negative for rash.   Neurological:  Positive for headaches. Negative for dizziness and altered mental status.   Hematologic/Lymphatic: Negative for swollen lymph nodes.   Psychiatric/Behavioral:  " Negative for altered mental status.     History reviewed. No pertinent past medical history.    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  From here.       Current Outpatient Medications   Medication Sig Dispense Refill    amoxicillin (AMOXIL) 500 MG Tab Take 1 tablet (500 mg total) by mouth every 12 (twelve) hours. for 10 days 20 tablet 0    ascorbic acid, vitamin C, (VITAMIN C) 1000 MG tablet Take 1,000 mg by mouth once daily.      cetirizine (ZYRTEC) 10 MG tablet Take 1 tablet (10 mg total) by mouth nightly as needed for Allergies. 90 tablet 3    cholecalciferol, vitamin D3, (VITAMIN D3) 50 mcg (2,000 unit) Cap capsule Take 1 capsule by mouth once daily.      desvenlafaxine succinate (PRISTIQ) 100 MG Tb24 TAKE 1 TABLET BY MOUTH EVERY DAY 90 tablet 1    predniSONE (DELTASONE) 20 MG tablet Take 40mg (2 tablets) x 2 days, 30mg (1.5 tablets) x 2 days, 20mg (1 tablet) x 2 days, 10mg (0.5 tablet) x 2 days. 10 tablet 0    promethazine-dextromethorphan (PROMETHAZINE-DM) 6.25-15 mg/5 mL Syrp Take 5 mLs by mouth every 6 (six) hours as needed (for cough). Will cause  drowsiness. 118 mL 0    zinc gluconate 50 mg tablet Take 50 mg by mouth once daily.       No current facility-administered medications for this visit.       Review of patient's allergies indicates:  No Known Allergies    Objective:     Physical Exam   Constitutional: She is oriented to person, place, and time. She appears well-developed. She is cooperative.  Non-toxic appearance. She does not appear ill. No distress.   HENT:   Head: Normocephalic and atraumatic.   Ears:   Right Ear: Hearing, external ear and ear canal normal. Tympanic membrane is erythematous. Tympanic membrane is not injected, not retracted and not bulging. A middle ear effusion is present. impacted cerumen  Left Ear: Hearing, external ear and ear canal normal. Tympanic membrane is not injected, not erythematous, not retracted and not bulging. A middle ear effusion is present. impacted cerumen  Nose: Congestion present. No mucosal edema, rhinorrhea or nasal deformity. No epistaxis. Right sinus exhibits no maxillary sinus tenderness and no frontal sinus tenderness. Left sinus exhibits no maxillary sinus tenderness and no frontal sinus tenderness.   Mouth/Throat: Uvula is midline and mucous membranes are normal. Mucous membranes are moist. No trismus in the jaw. Normal dentition. No uvula swelling. Posterior oropharyngeal erythema present. No oropharyngeal exudate or posterior oropharyngeal edema.   Eyes: Conjunctivae and lids are normal. Right eye exhibits no discharge. Left eye exhibits no discharge. No scleral icterus.   Neck: Trachea normal and phonation normal. Neck supple. No edema present. No erythema present. No neck rigidity present.   Cardiovascular: Normal rate, regular rhythm, normal heart sounds and normal pulses.   No murmur heard.Exam reveals no gallop and no friction rub.   Pulmonary/Chest: Effort normal and breath sounds normal. No stridor. No respiratory distress. She has no decreased breath sounds. She has no wheezes. She has no  rhonchi. She has no rales.   Abdominal: Normal appearance.   Musculoskeletal:      Cervical back: She exhibits no tenderness.   Lymphadenopathy:     She has no cervical adenopathy.   Neurological: She is alert and oriented to person, place, and time. She exhibits normal muscle tone.   Skin: Skin is warm, dry, intact, not diaphoretic, not pale and no rash.   Psychiatric: Her speech is normal and behavior is normal. Mood, judgment and thought content normal.   Nursing note and vitals reviewed.      Assessment:     1. Non-recurrent acute serous otitis media of right ear    2. Acute effusion of left ear        Plan:       Non-recurrent acute serous otitis media of right ear  -     amoxicillin (AMOXIL) 500 MG Tab; Take 1 tablet (500 mg total) by mouth every 12 (twelve) hours. for 10 days  Dispense: 20 tablet; Refill: 0    Acute effusion of left ear    Results reviewed  I have reviewed the patient chart and pertinent past imaging/labs.  Patient Instructions   Take the antibiotics as prescribed with food. Avoid using qtips. Use tylenol/ibuprofen as needed for pain relief/fevers.  Use Flonase for nasal congestion If your symptoms should worsen please return to the urgent care or ED as needed.

## 2024-04-11 ENCOUNTER — PATIENT MESSAGE (OUTPATIENT)
Dept: ADMINISTRATIVE | Facility: HOSPITAL | Age: 29
End: 2024-04-11
Payer: MEDICAID

## 2024-07-09 ENCOUNTER — PATIENT MESSAGE (OUTPATIENT)
Dept: ADMINISTRATIVE | Facility: HOSPITAL | Age: 29
End: 2024-07-09
Payer: MEDICAID

## 2024-08-14 RX ORDER — DESVENLAFAXINE 100 MG/1
100 TABLET, EXTENDED RELEASE ORAL
Qty: 30 TABLET | Refills: 0 | Status: SHIPPED | OUTPATIENT
Start: 2024-08-14

## 2024-08-14 NOTE — TELEPHONE ENCOUNTER
No care due was identified.  [facility] Embedded Care Due Messages. Reference number: [id number].   8/14/2024 1:33:18 AM CDT

## 2024-08-14 NOTE — TELEPHONE ENCOUNTER
Refill Routing Note   Medication(s) are not appropriate for processing by [facility] for the following reason(s):        Required labs outdated    [facility] action(s):  Defer             Appointments  past 12m or future 3m with PCP    Date Provider   Last Visit   6/2/2023 Isidra Skelton MD   Next Visit   Visit date not found Isidra Skelton MD   ED visits in past 90 days: 0        Note composed:8:04 AM 08/14/2024

## 2024-08-14 NOTE — TELEPHONE ENCOUNTER
stated: Can you please schedule this patient to see me in the office for annual checkup, she will need an appointment for refills.  She was a no-show, I gave her 30 tablets and no refills.  Thank you.       Tried to call pt, unsuccessful in reaching her, lvm and tho njg